# Patient Record
Sex: MALE | Race: WHITE | Employment: OTHER | URBAN - METROPOLITAN AREA
[De-identification: names, ages, dates, MRNs, and addresses within clinical notes are randomized per-mention and may not be internally consistent; named-entity substitution may affect disease eponyms.]

---

## 2021-01-01 ENCOUNTER — HOSPITAL ENCOUNTER (INPATIENT)
Age: 71
LOS: 7 days | DRG: 190 | End: 2021-03-03
Attending: EMERGENCY MEDICINE | Admitting: INTERNAL MEDICINE
Payer: MEDICARE

## 2021-01-01 ENCOUNTER — APPOINTMENT (OUTPATIENT)
Dept: MRI IMAGING | Age: 71
DRG: 190 | End: 2021-01-01
Payer: MEDICARE

## 2021-01-01 ENCOUNTER — ANESTHESIA (OUTPATIENT)
Dept: ENDOSCOPY | Age: 71
DRG: 190 | End: 2021-01-01
Payer: MEDICARE

## 2021-01-01 ENCOUNTER — ANESTHESIA EVENT (OUTPATIENT)
Dept: ENDOSCOPY | Age: 71
DRG: 190 | End: 2021-01-01
Payer: MEDICARE

## 2021-01-01 ENCOUNTER — APPOINTMENT (OUTPATIENT)
Dept: GENERAL RADIOLOGY | Age: 71
DRG: 190 | End: 2021-01-01
Payer: MEDICARE

## 2021-01-01 ENCOUNTER — APPOINTMENT (OUTPATIENT)
Dept: CT IMAGING | Age: 71
DRG: 190 | End: 2021-01-01
Payer: MEDICARE

## 2021-01-01 VITALS — OXYGEN SATURATION: 93 % | SYSTOLIC BLOOD PRESSURE: 127 MMHG | DIASTOLIC BLOOD PRESSURE: 79 MMHG

## 2021-01-01 VITALS
BODY MASS INDEX: 23.34 KG/M2 | WEIGHT: 163 LBS | RESPIRATION RATE: 32 BRPM | TEMPERATURE: 98.8 F | HEIGHT: 70 IN | SYSTOLIC BLOOD PRESSURE: 112 MMHG | HEART RATE: 113 BPM | OXYGEN SATURATION: 60 % | DIASTOLIC BLOOD PRESSURE: 62 MMHG

## 2021-01-01 DIAGNOSIS — K80.20 CALCULUS OF GALLBLADDER WITHOUT CHOLECYSTITIS WITHOUT OBSTRUCTION: ICD-10-CM

## 2021-01-01 DIAGNOSIS — R77.8 ELEVATED TROPONIN: ICD-10-CM

## 2021-01-01 DIAGNOSIS — N17.9 AKI (ACUTE KIDNEY INJURY) (HCC): ICD-10-CM

## 2021-01-01 DIAGNOSIS — J44.1 COPD EXACERBATION (HCC): Primary | ICD-10-CM

## 2021-01-01 LAB
ALBUMIN SERPL-MCNC: 3.1 G/DL (ref 3.5–5.2)
ALBUMIN SERPL-MCNC: 3.4 G/DL (ref 3.5–5.2)
ALP BLD-CCNC: 90 U/L (ref 40–129)
ALP BLD-CCNC: 91 U/L (ref 40–129)
ALT SERPL-CCNC: 40 U/L (ref 0–40)
ALT SERPL-CCNC: 42 U/L (ref 0–40)
ANION GAP SERPL CALCULATED.3IONS-SCNC: 17 MMOL/L (ref 7–16)
ANION GAP SERPL CALCULATED.3IONS-SCNC: 18 MMOL/L (ref 7–16)
ANION GAP SERPL CALCULATED.3IONS-SCNC: 19 MMOL/L (ref 7–16)
ANION GAP SERPL CALCULATED.3IONS-SCNC: 8 MMOL/L (ref 7–16)
ANION GAP SERPL CALCULATED.3IONS-SCNC: 9 MMOL/L (ref 7–16)
ANISOCYTOSIS: ABNORMAL
AST SERPL-CCNC: 47 U/L (ref 0–39)
AST SERPL-CCNC: 78 U/L (ref 0–39)
B.E.: -4.6 MMOL/L (ref -3–3)
B.E.: 4.3 MMOL/L (ref -3–3)
BASOPHILS ABSOLUTE: 0 E9/L (ref 0–0.2)
BASOPHILS ABSOLUTE: 0.02 E9/L (ref 0–0.2)
BASOPHILS RELATIVE PERCENT: 0 % (ref 0–2)
BASOPHILS RELATIVE PERCENT: 0 % (ref 0–2)
BASOPHILS RELATIVE PERCENT: 0.2 % (ref 0–2)
BASOPHILS RELATIVE PERCENT: 0.6 % (ref 0–2)
BASOPHILS RELATIVE PERCENT: 0.9 % (ref 0–2)
BILIRUB SERPL-MCNC: 0.4 MG/DL (ref 0–1.2)
BILIRUB SERPL-MCNC: 0.5 MG/DL (ref 0–1.2)
BUN BLDV-MCNC: 26 MG/DL (ref 8–23)
BUN BLDV-MCNC: 28 MG/DL (ref 8–23)
BUN BLDV-MCNC: 34 MG/DL (ref 8–23)
BUN BLDV-MCNC: 45 MG/DL (ref 8–23)
BUN BLDV-MCNC: 62 MG/DL (ref 8–23)
BURR CELLS: ABNORMAL
BURR CELLS: ABNORMAL
CALCIUM SERPL-MCNC: 8.1 MG/DL (ref 8.6–10.2)
CALCIUM SERPL-MCNC: 8.4 MG/DL (ref 8.6–10.2)
CALCIUM SERPL-MCNC: 8.5 MG/DL (ref 8.6–10.2)
CALCIUM SERPL-MCNC: 8.6 MG/DL (ref 8.6–10.2)
CALCIUM SERPL-MCNC: 8.9 MG/DL (ref 8.6–10.2)
CHLORIDE BLD-SCNC: 100 MMOL/L (ref 98–107)
CHLORIDE BLD-SCNC: 91 MMOL/L (ref 98–107)
CHLORIDE BLD-SCNC: 93 MMOL/L (ref 98–107)
CHLORIDE BLD-SCNC: 94 MMOL/L (ref 98–107)
CHLORIDE BLD-SCNC: 98 MMOL/L (ref 98–107)
CHOLESTEROL, TOTAL: 173 MG/DL (ref 0–199)
CO2: 22 MMOL/L (ref 22–29)
CO2: 23 MMOL/L (ref 22–29)
CO2: 24 MMOL/L (ref 22–29)
CO2: 30 MMOL/L (ref 22–29)
CO2: 30 MMOL/L (ref 22–29)
COHB: 0.1 % (ref 0–1.5)
COHB: 0.3 % (ref 0–1.5)
CREAT SERPL-MCNC: 1 MG/DL (ref 0.7–1.2)
CREAT SERPL-MCNC: 1 MG/DL (ref 0.7–1.2)
CREAT SERPL-MCNC: 1.4 MG/DL (ref 0.7–1.2)
CREAT SERPL-MCNC: 2 MG/DL (ref 0.7–1.2)
CREAT SERPL-MCNC: 2.1 MG/DL (ref 0.7–1.2)
CRITICAL: ABNORMAL
CRITICAL: ABNORMAL
DATE ANALYZED: ABNORMAL
DATE ANALYZED: ABNORMAL
DATE OF COLLECTION: ABNORMAL
DATE OF COLLECTION: ABNORMAL
EKG ATRIAL RATE: 122 BPM
EKG ATRIAL RATE: 90 BPM
EKG ATRIAL RATE: 90 BPM
EKG ATRIAL RATE: 92 BPM
EKG P AXIS: 83 DEGREES
EKG P AXIS: 84 DEGREES
EKG P AXIS: 86 DEGREES
EKG P AXIS: 89 DEGREES
EKG P-R INTERVAL: 144 MS
EKG P-R INTERVAL: 170 MS
EKG P-R INTERVAL: 174 MS
EKG P-R INTERVAL: 180 MS
EKG Q-T INTERVAL: 274 MS
EKG Q-T INTERVAL: 354 MS
EKG Q-T INTERVAL: 362 MS
EKG Q-T INTERVAL: 384 MS
EKG QRS DURATION: 110 MS
EKG QRS DURATION: 112 MS
EKG QRS DURATION: 112 MS
EKG QRS DURATION: 98 MS
EKG QTC CALCULATION (BAZETT): 390 MS
EKG QTC CALCULATION (BAZETT): 433 MS
EKG QTC CALCULATION (BAZETT): 447 MS
EKG QTC CALCULATION (BAZETT): 469 MS
EKG R AXIS: 84 DEGREES
EKG R AXIS: 88 DEGREES
EKG R AXIS: 90 DEGREES
EKG R AXIS: 96 DEGREES
EKG T AXIS: 102 DEGREES
EKG T AXIS: 68 DEGREES
EKG T AXIS: 7 DEGREES
EKG T AXIS: 73 DEGREES
EKG VENTRICULAR RATE: 122 BPM
EKG VENTRICULAR RATE: 90 BPM
EKG VENTRICULAR RATE: 90 BPM
EKG VENTRICULAR RATE: 92 BPM
EOSINOPHILS ABSOLUTE: 0 E9/L (ref 0.05–0.5)
EOSINOPHILS ABSOLUTE: 0.05 E9/L (ref 0.05–0.5)
EOSINOPHILS ABSOLUTE: 0.14 E9/L (ref 0.05–0.5)
EOSINOPHILS RELATIVE PERCENT: 0 % (ref 0–6)
EOSINOPHILS RELATIVE PERCENT: 0.9 % (ref 0–6)
EOSINOPHILS RELATIVE PERCENT: 2.6 % (ref 0–6)
FIO2: 60 %
FOLATE: 19.1 NG/ML (ref 4.8–24.2)
GFR AFRICAN AMERICAN: 38
GFR AFRICAN AMERICAN: 40
GFR AFRICAN AMERICAN: >60
GFR NON-AFRICAN AMERICAN: 31 ML/MIN/1.73
GFR NON-AFRICAN AMERICAN: 33 ML/MIN/1.73
GFR NON-AFRICAN AMERICAN: 50 ML/MIN/1.73
GFR NON-AFRICAN AMERICAN: >60 ML/MIN/1.73
GFR NON-AFRICAN AMERICAN: >60 ML/MIN/1.73
GLUCOSE BLD-MCNC: 167 MG/DL (ref 74–99)
GLUCOSE BLD-MCNC: 192 MG/DL (ref 74–99)
GLUCOSE BLD-MCNC: 65 MG/DL (ref 74–99)
GLUCOSE BLD-MCNC: 91 MG/DL (ref 74–99)
GLUCOSE BLD-MCNC: 98 MG/DL (ref 74–99)
HBA1C MFR BLD: 4.9 % (ref 4–5.6)
HCO3: 21.5 MMOL/L (ref 22–26)
HCO3: 28.3 MMOL/L (ref 22–26)
HCT VFR BLD CALC: 33.2 % (ref 37–54)
HCT VFR BLD CALC: 33.5 % (ref 37–54)
HCT VFR BLD CALC: 34.2 % (ref 37–54)
HCT VFR BLD CALC: 40.8 % (ref 37–54)
HCT VFR BLD CALC: 44.9 % (ref 37–54)
HDLC SERPL-MCNC: 101 MG/DL
HEMOGLOBIN: 10.9 G/DL (ref 12.5–16.5)
HEMOGLOBIN: 11 G/DL (ref 12.5–16.5)
HEMOGLOBIN: 11.6 G/DL (ref 12.5–16.5)
HEMOGLOBIN: 14 G/DL (ref 12.5–16.5)
HEMOGLOBIN: 14.7 G/DL (ref 12.5–16.5)
HHB: 4.7 % (ref 0–5)
HHB: 8.3 % (ref 0–5)
HYPOCHROMIA: ABNORMAL
IMMATURE GRANULOCYTES #: 0.09 E9/L
IMMATURE GRANULOCYTES #: 0.19 E9/L
IMMATURE GRANULOCYTES %: 1.2 % (ref 0–5)
IMMATURE GRANULOCYTES %: 1.6 % (ref 0–5)
LAB: ABNORMAL
LAB: ABNORMAL
LACTIC ACID: 2.1 MMOL/L (ref 0.5–2.2)
LDL CHOLESTEROL CALCULATED: 54 MG/DL (ref 0–99)
LV EF: 58 %
LVEF MODALITY: NORMAL
LYMPHOCYTES ABSOLUTE: 0.16 E9/L (ref 1.5–4)
LYMPHOCYTES ABSOLUTE: 0.31 E9/L (ref 1.5–4)
LYMPHOCYTES ABSOLUTE: 0.42 E9/L (ref 1.5–4)
LYMPHOCYTES ABSOLUTE: 0.54 E9/L (ref 1.5–4)
LYMPHOCYTES ABSOLUTE: 0.86 E9/L (ref 1.5–4)
LYMPHOCYTES RELATIVE PERCENT: 10.4 % (ref 20–42)
LYMPHOCYTES RELATIVE PERCENT: 11.7 % (ref 20–42)
LYMPHOCYTES RELATIVE PERCENT: 2.5 % (ref 20–42)
LYMPHOCYTES RELATIVE PERCENT: 4.4 % (ref 20–42)
LYMPHOCYTES RELATIVE PERCENT: 7.8 % (ref 20–42)
Lab: ABNORMAL
Lab: ABNORMAL
MCH RBC QN AUTO: 35.4 PG (ref 26–35)
MCH RBC QN AUTO: 35.8 PG (ref 26–35)
MCH RBC QN AUTO: 35.9 PG (ref 26–35)
MCH RBC QN AUTO: 35.9 PG (ref 26–35)
MCH RBC QN AUTO: 36.3 PG (ref 26–35)
MCHC RBC AUTO-ENTMCNC: 32.7 % (ref 32–34.5)
MCHC RBC AUTO-ENTMCNC: 32.8 % (ref 32–34.5)
MCHC RBC AUTO-ENTMCNC: 32.8 % (ref 32–34.5)
MCHC RBC AUTO-ENTMCNC: 33.9 % (ref 32–34.5)
MCHC RBC AUTO-ENTMCNC: 34.3 % (ref 32–34.5)
MCV RBC AUTO: 105.7 FL (ref 80–99.9)
MCV RBC AUTO: 105.9 FL (ref 80–99.9)
MCV RBC AUTO: 107.7 FL (ref 80–99.9)
MCV RBC AUTO: 109.2 FL (ref 80–99.9)
MCV RBC AUTO: 109.2 FL (ref 80–99.9)
METAMYELOCYTES RELATIVE PERCENT: 0.9 % (ref 0–1)
METER GLUCOSE: 123 MG/DL (ref 74–99)
METER GLUCOSE: 174 MG/DL (ref 74–99)
METER GLUCOSE: 54 MG/DL (ref 74–99)
METER GLUCOSE: 69 MG/DL (ref 74–99)
METER GLUCOSE: 93 MG/DL (ref 74–99)
METHB: 0.4 % (ref 0–1.5)
METHB: 0.5 % (ref 0–1.5)
MODE: ABNORMAL
MODE: ABNORMAL
MONOCYTES ABSOLUTE: 0.23 E9/L (ref 0.1–0.95)
MONOCYTES ABSOLUTE: 0.65 E9/L (ref 0.1–0.95)
MONOCYTES ABSOLUTE: 0.8 E9/L (ref 0.1–0.95)
MONOCYTES ABSOLUTE: 0.8 E9/L (ref 0.1–0.95)
MONOCYTES ABSOLUTE: 0.95 E9/L (ref 0.1–0.95)
MONOCYTES RELATIVE PERCENT: 12.2 % (ref 2–12)
MONOCYTES RELATIVE PERCENT: 12.9 % (ref 2–12)
MONOCYTES RELATIVE PERCENT: 14.8 % (ref 2–12)
MONOCYTES RELATIVE PERCENT: 6.1 % (ref 2–12)
MONOCYTES RELATIVE PERCENT: 6.5 % (ref 2–12)
MYELOCYTE PERCENT: 0.9 % (ref 0–0)
NEUTROPHILS ABSOLUTE: 10.9 E9/L (ref 1.8–7.3)
NEUTROPHILS ABSOLUTE: 3.51 E9/L (ref 1.8–7.3)
NEUTROPHILS ABSOLUTE: 4.05 E9/L (ref 1.8–7.3)
NEUTROPHILS ABSOLUTE: 4.08 E9/L (ref 1.8–7.3)
NEUTROPHILS ABSOLUTE: 5.47 E9/L (ref 1.8–7.3)
NEUTROPHILS RELATIVE PERCENT: 73 % (ref 43–80)
NEUTROPHILS RELATIVE PERCENT: 74.2 % (ref 43–80)
NEUTROPHILS RELATIVE PERCENT: 76.5 % (ref 43–80)
NEUTROPHILS RELATIVE PERCENT: 89.2 % (ref 43–80)
NEUTROPHILS RELATIVE PERCENT: 89.5 % (ref 43–80)
O2 CONTENT: 15.6 ML/DL
O2 CONTENT: 20.3 ML/DL
O2 SATURATION: 91.6 % (ref 92–98.5)
O2 SATURATION: 95.3 % (ref 92–98.5)
O2HB: 91 % (ref 94–97)
O2HB: 94.7 % (ref 94–97)
OPERATOR ID: 7490
OPERATOR ID: ABNORMAL
OVALOCYTES: ABNORMAL
OVALOCYTES: ABNORMAL
PATIENT TEMP: 37 C
PATIENT TEMP: 37 C
PCO2: 39.8 MMHG (ref 35–45)
PCO2: 43.3 MMHG (ref 35–45)
PDW BLD-RTO: 12.9 FL (ref 11.5–15)
PDW BLD-RTO: 13 FL (ref 11.5–15)
PDW BLD-RTO: 13.2 FL (ref 11.5–15)
PDW BLD-RTO: 13.2 FL (ref 11.5–15)
PDW BLD-RTO: 13.5 FL (ref 11.5–15)
PEEP/CPAP: 5 CMH2O
PFO2: 1.3 MMHG/%
PH BLOOD GAS: 7.31 (ref 7.35–7.45)
PH BLOOD GAS: 7.47 (ref 7.35–7.45)
PIP: 12 CMH2O
PLATELET # BLD: 229 E9/L (ref 130–450)
PLATELET # BLD: 238 E9/L (ref 130–450)
PLATELET # BLD: 246 E9/L (ref 130–450)
PLATELET # BLD: 288 E9/L (ref 130–450)
PLATELET # BLD: 293 E9/L (ref 130–450)
PMV BLD AUTO: 10.7 FL (ref 7–12)
PMV BLD AUTO: 10.8 FL (ref 7–12)
PMV BLD AUTO: 10.8 FL (ref 7–12)
PMV BLD AUTO: 11.2 FL (ref 7–12)
PMV BLD AUTO: 11.3 FL (ref 7–12)
PO2: 57.3 MMHG (ref 75–100)
PO2: 77.9 MMHG (ref 75–100)
POIKILOCYTES: ABNORMAL
POIKILOCYTES: ABNORMAL
POLYCHROMASIA: ABNORMAL
POLYCHROMASIA: ABNORMAL
POTASSIUM REFLEX MAGNESIUM: 4.1 MMOL/L (ref 3.5–5)
POTASSIUM REFLEX MAGNESIUM: 4.4 MMOL/L (ref 3.5–5)
POTASSIUM SERPL-SCNC: 3.6 MMOL/L (ref 3.5–5)
POTASSIUM SERPL-SCNC: 3.7 MMOL/L (ref 3.5–5)
POTASSIUM SERPL-SCNC: 4.7 MMOL/L (ref 3.5–5)
PRO-BNP: 2685 PG/ML (ref 0–125)
PROCALCITONIN: 2.91 NG/ML (ref 0–0.08)
RBC # BLD: 3.04 E12/L (ref 3.8–5.8)
RBC # BLD: 3.11 E12/L (ref 3.8–5.8)
RBC # BLD: 3.23 E12/L (ref 3.8–5.8)
RBC # BLD: 3.86 E12/L (ref 3.8–5.8)
RBC # BLD: 4.11 E12/L (ref 3.8–5.8)
RI(T): 3.69
SARS-COV-2, NAAT: NOT DETECTED
SODIUM BLD-SCNC: 132 MMOL/L (ref 132–146)
SODIUM BLD-SCNC: 133 MMOL/L (ref 132–146)
SODIUM BLD-SCNC: 135 MMOL/L (ref 132–146)
SODIUM BLD-SCNC: 137 MMOL/L (ref 132–146)
SODIUM BLD-SCNC: 139 MMOL/L (ref 132–146)
SOURCE, BLOOD GAS: ABNORMAL
SOURCE, BLOOD GAS: ABNORMAL
THB: 12.2 G/DL (ref 11.5–16.5)
THB: 15.2 G/DL (ref 11.5–16.5)
TIME ANALYZED: 550
TIME ANALYZED: 754
TOTAL PROTEIN: 5.3 G/DL (ref 6.4–8.3)
TOTAL PROTEIN: 5.7 G/DL (ref 6.4–8.3)
TRIGL SERPL-MCNC: 91 MG/DL (ref 0–149)
TROPONIN: 0.08 NG/ML (ref 0–0.03)
TROPONIN: 0.41 NG/ML (ref 0–0.03)
TSH SERPL DL<=0.05 MIU/L-ACNC: 1.61 UIU/ML (ref 0.27–4.2)
VITAMIN B-12: 884 PG/ML (ref 211–946)
VLDLC SERPL CALC-MCNC: 18 MG/DL
WBC # BLD: 12.2 E9/L (ref 4.5–11.5)
WBC # BLD: 3.9 E9/L (ref 4.5–11.5)
WBC # BLD: 5.3 E9/L (ref 4.5–11.5)
WBC # BLD: 5.4 E9/L (ref 4.5–11.5)
WBC # BLD: 7.4 E9/L (ref 4.5–11.5)

## 2021-01-01 PROCEDURE — 6360000002 HC RX W HCPCS: Performed by: INTERNAL MEDICINE

## 2021-01-01 PROCEDURE — 2060000000 HC ICU INTERMEDIATE R&B

## 2021-01-01 PROCEDURE — 2580000003 HC RX 258: Performed by: INTERNAL MEDICINE

## 2021-01-01 PROCEDURE — 85025 COMPLETE CBC W/AUTO DIFF WBC: CPT

## 2021-01-01 PROCEDURE — 97110 THERAPEUTIC EXERCISES: CPT

## 2021-01-01 PROCEDURE — 93306 TTE W/DOPPLER COMPLETE: CPT

## 2021-01-01 PROCEDURE — 82805 BLOOD GASES W/O2 SATURATION: CPT

## 2021-01-01 PROCEDURE — 2700000000 HC OXYGEN THERAPY PER DAY

## 2021-01-01 PROCEDURE — 2580000003 HC RX 258: Performed by: NURSE ANESTHETIST, CERTIFIED REGISTERED

## 2021-01-01 PROCEDURE — 6370000000 HC RX 637 (ALT 250 FOR IP): Performed by: INTERNAL MEDICINE

## 2021-01-01 PROCEDURE — 94660 CPAP INITIATION&MGMT: CPT

## 2021-01-01 PROCEDURE — 84145 PROCALCITONIN (PCT): CPT

## 2021-01-01 PROCEDURE — 0DB58ZX EXCISION OF ESOPHAGUS, VIA NATURAL OR ARTIFICIAL OPENING ENDOSCOPIC, DIAGNOSTIC: ICD-10-PCS | Performed by: INTERNAL MEDICINE

## 2021-01-01 PROCEDURE — 6360000002 HC RX W HCPCS

## 2021-01-01 PROCEDURE — 97530 THERAPEUTIC ACTIVITIES: CPT

## 2021-01-01 PROCEDURE — 82962 GLUCOSE BLOOD TEST: CPT

## 2021-01-01 PROCEDURE — 99232 SBSQ HOSP IP/OBS MODERATE 35: CPT | Performed by: INTERNAL MEDICINE

## 2021-01-01 PROCEDURE — 3609017700 HC EGD DILATION GASTRIC/DUODENAL STRICTURE: Performed by: INTERNAL MEDICINE

## 2021-01-01 PROCEDURE — 94640 AIRWAY INHALATION TREATMENT: CPT

## 2021-01-01 PROCEDURE — 0DB68ZX EXCISION OF STOMACH, VIA NATURAL OR ARTIFICIAL OPENING ENDOSCOPIC, DIAGNOSTIC: ICD-10-PCS | Performed by: INTERNAL MEDICINE

## 2021-01-01 PROCEDURE — 2500000003 HC RX 250 WO HCPCS: Performed by: INTERNAL MEDICINE

## 2021-01-01 PROCEDURE — 82607 VITAMIN B-12: CPT

## 2021-01-01 PROCEDURE — 97165 OT EVAL LOW COMPLEX 30 MIN: CPT

## 2021-01-01 PROCEDURE — 80048 BASIC METABOLIC PNL TOTAL CA: CPT

## 2021-01-01 PROCEDURE — 92611 MOTION FLUOROSCOPY/SWALLOW: CPT | Performed by: SPEECH-LANGUAGE PATHOLOGIST

## 2021-01-01 PROCEDURE — 83036 HEMOGLOBIN GLYCOSYLATED A1C: CPT

## 2021-01-01 PROCEDURE — 36415 COLL VENOUS BLD VENIPUNCTURE: CPT

## 2021-01-01 PROCEDURE — 80053 COMPREHEN METABOLIC PANEL: CPT

## 2021-01-01 PROCEDURE — 93005 ELECTROCARDIOGRAM TRACING: CPT | Performed by: INTERNAL MEDICINE

## 2021-01-01 PROCEDURE — 2580000003 HC RX 258: Performed by: SURGERY

## 2021-01-01 PROCEDURE — 93010 ELECTROCARDIOGRAM REPORT: CPT | Performed by: INTERNAL MEDICINE

## 2021-01-01 PROCEDURE — 2709999900 HC NON-CHARGEABLE SUPPLY: Performed by: INTERNAL MEDICINE

## 2021-01-01 PROCEDURE — 3700000000 HC ANESTHESIA ATTENDED CARE: Performed by: INTERNAL MEDICINE

## 2021-01-01 PROCEDURE — 84443 ASSAY THYROID STIM HORMONE: CPT

## 2021-01-01 PROCEDURE — 70551 MRI BRAIN STEM W/O DYE: CPT

## 2021-01-01 PROCEDURE — 71250 CT THORAX DX C-: CPT

## 2021-01-01 PROCEDURE — 99291 CRITICAL CARE FIRST HOUR: CPT | Performed by: INTERNAL MEDICINE

## 2021-01-01 PROCEDURE — 83880 ASSAY OF NATRIURETIC PEPTIDE: CPT

## 2021-01-01 PROCEDURE — 72141 MRI NECK SPINE W/O DYE: CPT

## 2021-01-01 PROCEDURE — 93005 ELECTROCARDIOGRAM TRACING: CPT | Performed by: EMERGENCY MEDICINE

## 2021-01-01 PROCEDURE — 3609012400 HC EGD TRANSORAL BIOPSY SINGLE/MULTIPLE: Performed by: INTERNAL MEDICINE

## 2021-01-01 PROCEDURE — 93005 ELECTROCARDIOGRAM TRACING: CPT | Performed by: STUDENT IN AN ORGANIZED HEALTH CARE EDUCATION/TRAINING PROGRAM

## 2021-01-01 PROCEDURE — 84484 ASSAY OF TROPONIN QUANT: CPT

## 2021-01-01 PROCEDURE — 6360000004 HC RX CONTRAST MEDICATION: Performed by: RADIOLOGY

## 2021-01-01 PROCEDURE — 36600 WITHDRAWAL OF ARTERIAL BLOOD: CPT

## 2021-01-01 PROCEDURE — 97535 SELF CARE MNGMENT TRAINING: CPT

## 2021-01-01 PROCEDURE — 71275 CT ANGIOGRAPHY CHEST: CPT

## 2021-01-01 PROCEDURE — 82746 ASSAY OF FOLIC ACID SERUM: CPT

## 2021-01-01 PROCEDURE — 3700000001 HC ADD 15 MINUTES (ANESTHESIA): Performed by: INTERNAL MEDICINE

## 2021-01-01 PROCEDURE — 99222 1ST HOSP IP/OBS MODERATE 55: CPT | Performed by: STUDENT IN AN ORGANIZED HEALTH CARE EDUCATION/TRAINING PROGRAM

## 2021-01-01 PROCEDURE — 74230 X-RAY XM SWLNG FUNCJ C+: CPT

## 2021-01-01 PROCEDURE — 97110 THERAPEUTIC EXERCISES: CPT | Performed by: PHYSICAL THERAPIST

## 2021-01-01 PROCEDURE — 83605 ASSAY OF LACTIC ACID: CPT

## 2021-01-01 PROCEDURE — 88305 TISSUE EXAM BY PATHOLOGIST: CPT

## 2021-01-01 PROCEDURE — 88342 IMHCHEM/IMCYTCHM 1ST ANTB: CPT

## 2021-01-01 PROCEDURE — 2580000003 HC RX 258: Performed by: STUDENT IN AN ORGANIZED HEALTH CARE EDUCATION/TRAINING PROGRAM

## 2021-01-01 PROCEDURE — 71045 X-RAY EXAM CHEST 1 VIEW: CPT

## 2021-01-01 PROCEDURE — 80061 LIPID PANEL: CPT

## 2021-01-01 PROCEDURE — 92526 ORAL FUNCTION THERAPY: CPT | Performed by: SPEECH-LANGUAGE PATHOLOGIST

## 2021-01-01 PROCEDURE — 7100000010 HC PHASE II RECOVERY - FIRST 15 MIN: Performed by: INTERNAL MEDICINE

## 2021-01-01 PROCEDURE — 92610 EVALUATE SWALLOWING FUNCTION: CPT | Performed by: SPEECH-LANGUAGE PATHOLOGIST

## 2021-01-01 PROCEDURE — 99285 EMERGENCY DEPT VISIT HI MDM: CPT

## 2021-01-01 PROCEDURE — 6370000000 HC RX 637 (ALT 250 FOR IP): Performed by: EMERGENCY MEDICINE

## 2021-01-01 PROCEDURE — 87635 SARS-COV-2 COVID-19 AMP PRB: CPT

## 2021-01-01 PROCEDURE — 6360000002 HC RX W HCPCS: Performed by: NURSE ANESTHETIST, CERTIFIED REGISTERED

## 2021-01-01 PROCEDURE — 0D758ZZ DILATION OF ESOPHAGUS, VIA NATURAL OR ARTIFICIAL OPENING ENDOSCOPIC: ICD-10-PCS | Performed by: INTERNAL MEDICINE

## 2021-01-01 PROCEDURE — 99233 SBSQ HOSP IP/OBS HIGH 50: CPT | Performed by: INTERNAL MEDICINE

## 2021-01-01 PROCEDURE — 74220 X-RAY XM ESOPHAGUS 1CNTRST: CPT

## 2021-01-01 PROCEDURE — 99223 1ST HOSP IP/OBS HIGH 75: CPT | Performed by: INTERNAL MEDICINE

## 2021-01-01 PROCEDURE — 7100000011 HC PHASE II RECOVERY - ADDTL 15 MIN: Performed by: INTERNAL MEDICINE

## 2021-01-01 PROCEDURE — 97161 PT EVAL LOW COMPLEX 20 MIN: CPT | Performed by: PHYSICAL THERAPIST

## 2021-01-01 RX ORDER — HYDROCODONE BITARTRATE AND ACETAMINOPHEN 5; 325 MG/1; MG/1
1 TABLET ORAL 3 TIMES DAILY
COMMUNITY

## 2021-01-01 RX ORDER — AZITHROMYCIN 250 MG/1
250 TABLET, FILM COATED ORAL DAILY
Status: DISCONTINUED | OUTPATIENT
Start: 2021-01-01 | End: 2021-03-04 | Stop reason: HOSPADM

## 2021-01-01 RX ORDER — PROPOFOL 10 MG/ML
INJECTION, EMULSION INTRAVENOUS PRN
Status: DISCONTINUED | OUTPATIENT
Start: 2021-01-01 | End: 2021-01-01 | Stop reason: SDUPTHER

## 2021-01-01 RX ORDER — HYDROCODONE BITARTRATE AND ACETAMINOPHEN 5; 325 MG/1; MG/1
1 TABLET ORAL 3 TIMES DAILY
Status: DISCONTINUED | OUTPATIENT
Start: 2021-01-01 | End: 2021-01-01

## 2021-01-01 RX ORDER — METHYLPREDNISOLONE SODIUM SUCCINATE 125 MG/2ML
125 INJECTION, POWDER, LYOPHILIZED, FOR SOLUTION INTRAMUSCULAR; INTRAVENOUS ONCE
Status: COMPLETED | OUTPATIENT
Start: 2021-01-01 | End: 2021-01-01

## 2021-01-01 RX ORDER — METHYLPREDNISOLONE 4 MG/1
4 TABLET ORAL
Status: DISCONTINUED | OUTPATIENT
Start: 2021-01-01 | End: 2021-01-01

## 2021-01-01 RX ORDER — LORAZEPAM 2 MG/ML
0.5 INJECTION INTRAMUSCULAR ONCE
Status: COMPLETED | OUTPATIENT
Start: 2021-01-01 | End: 2021-01-01

## 2021-01-01 RX ORDER — HYDROCODONE BITARTRATE AND ACETAMINOPHEN 5; 325 MG/1; MG/1
1 TABLET ORAL 3 TIMES DAILY PRN
Status: DISCONTINUED | OUTPATIENT
Start: 2021-01-01 | End: 2021-03-04 | Stop reason: HOSPADM

## 2021-01-01 RX ORDER — LORAZEPAM 2 MG/ML
1 INJECTION INTRAMUSCULAR ONCE
Status: COMPLETED | OUTPATIENT
Start: 2021-01-01 | End: 2021-01-01

## 2021-01-01 RX ORDER — FUROSEMIDE 10 MG/ML
20 INJECTION INTRAMUSCULAR; INTRAVENOUS ONCE
Status: COMPLETED | OUTPATIENT
Start: 2021-01-01 | End: 2021-01-01

## 2021-01-01 RX ORDER — ASPIRIN 300 MG/1
300 SUPPOSITORY RECTAL DAILY
Status: DISCONTINUED | OUTPATIENT
Start: 2021-01-01 | End: 2021-03-04 | Stop reason: HOSPADM

## 2021-01-01 RX ORDER — BUDESONIDE 0.5 MG/2ML
0.5 INHALANT ORAL 2 TIMES DAILY
Status: DISCONTINUED | OUTPATIENT
Start: 2021-01-01 | End: 2021-03-04 | Stop reason: HOSPADM

## 2021-01-01 RX ORDER — SODIUM CHLORIDE 0.9 % (FLUSH) 0.9 %
10 SYRINGE (ML) INJECTION EVERY 12 HOURS SCHEDULED
Status: DISCONTINUED | OUTPATIENT
Start: 2021-01-01 | End: 2021-03-04 | Stop reason: HOSPADM

## 2021-01-01 RX ORDER — POLYETHYLENE GLYCOL 3350 17 G/17G
17 POWDER, FOR SOLUTION ORAL DAILY PRN
Status: DISCONTINUED | OUTPATIENT
Start: 2021-01-01 | End: 2021-03-04 | Stop reason: HOSPADM

## 2021-01-01 RX ORDER — METHYLPREDNISOLONE 4 MG/1
4 TABLET ORAL NIGHTLY
Status: DISCONTINUED | OUTPATIENT
Start: 2021-01-01 | End: 2021-01-01

## 2021-01-01 RX ORDER — SODIUM CHLORIDE 0.9 % (FLUSH) 0.9 %
10 SYRINGE (ML) INJECTION PRN
Status: DISCONTINUED | OUTPATIENT
Start: 2021-01-01 | End: 2021-03-04 | Stop reason: HOSPADM

## 2021-01-01 RX ORDER — VERAPAMIL HYDROCHLORIDE 240 MG/1
240 CAPSULE, EXTENDED RELEASE ORAL NIGHTLY
COMMUNITY

## 2021-01-01 RX ORDER — ROPINIROLE 0.5 MG/1
0.5 TABLET, FILM COATED ORAL NIGHTLY
COMMUNITY

## 2021-01-01 RX ORDER — ROPINIROLE 0.5 MG/1
0.5 TABLET, FILM COATED ORAL NIGHTLY
Status: DISCONTINUED | OUTPATIENT
Start: 2021-01-01 | End: 2021-03-04 | Stop reason: HOSPADM

## 2021-01-01 RX ORDER — METOPROLOL TARTRATE 5 MG/5ML
2.5 INJECTION INTRAVENOUS EVERY 6 HOURS
Status: DISCONTINUED | OUTPATIENT
Start: 2021-01-01 | End: 2021-03-04 | Stop reason: HOSPADM

## 2021-01-01 RX ORDER — LOSARTAN POTASSIUM AND HYDROCHLOROTHIAZIDE 12.5; 1 MG/1; MG/1
1 TABLET ORAL NIGHTLY
COMMUNITY

## 2021-01-01 RX ORDER — SODIUM CHLORIDE 9 MG/ML
INJECTION, SOLUTION INTRAVENOUS ONCE
Status: COMPLETED | OUTPATIENT
Start: 2021-01-01 | End: 2021-01-01

## 2021-01-01 RX ORDER — ONDANSETRON 2 MG/ML
4 INJECTION INTRAMUSCULAR; INTRAVENOUS EVERY 6 HOURS PRN
Status: DISCONTINUED | OUTPATIENT
Start: 2021-01-01 | End: 2021-03-04 | Stop reason: HOSPADM

## 2021-01-01 RX ORDER — VITAMIN B COMPLEX
1000 TABLET ORAL NIGHTLY
Status: DISCONTINUED | OUTPATIENT
Start: 2021-01-01 | End: 2021-03-04 | Stop reason: HOSPADM

## 2021-01-01 RX ORDER — ARFORMOTEROL TARTRATE 15 UG/2ML
15 SOLUTION RESPIRATORY (INHALATION) 2 TIMES DAILY
Status: DISCONTINUED | OUTPATIENT
Start: 2021-01-01 | End: 2021-03-04 | Stop reason: HOSPADM

## 2021-01-01 RX ORDER — PANTOPRAZOLE SODIUM 40 MG/1
40 TABLET, DELAYED RELEASE ORAL
Status: DISCONTINUED | OUTPATIENT
Start: 2021-01-01 | End: 2021-03-04 | Stop reason: HOSPADM

## 2021-01-01 RX ORDER — PREDNISONE 20 MG/1
40 TABLET ORAL DAILY
Status: DISCONTINUED | OUTPATIENT
Start: 2021-01-01 | End: 2021-01-01

## 2021-01-01 RX ORDER — IPRATROPIUM BROMIDE AND ALBUTEROL SULFATE 2.5; .5 MG/3ML; MG/3ML
3 SOLUTION RESPIRATORY (INHALATION) ONCE
Status: COMPLETED | OUTPATIENT
Start: 2021-01-01 | End: 2021-01-01

## 2021-01-01 RX ORDER — PROMETHAZINE HYDROCHLORIDE 25 MG/1
12.5 TABLET ORAL EVERY 6 HOURS PRN
Status: DISCONTINUED | OUTPATIENT
Start: 2021-01-01 | End: 2021-03-04 | Stop reason: HOSPADM

## 2021-01-01 RX ORDER — LORAZEPAM 2 MG/ML
1 INJECTION INTRAMUSCULAR EVERY 6 HOURS PRN
Status: DISCONTINUED | OUTPATIENT
Start: 2021-01-01 | End: 2021-03-04 | Stop reason: HOSPADM

## 2021-01-01 RX ORDER — METHYLPREDNISOLONE 4 MG/1
24 TABLET ORAL ONCE
Status: COMPLETED | OUTPATIENT
Start: 2021-01-01 | End: 2021-01-01

## 2021-01-01 RX ORDER — ALBUTEROL SULFATE 90 UG/1
2 AEROSOL, METERED RESPIRATORY (INHALATION) EVERY 6 HOURS PRN
COMMUNITY

## 2021-01-01 RX ORDER — BUDESONIDE AND FORMOTEROL FUMARATE DIHYDRATE 160; 4.5 UG/1; UG/1
2 AEROSOL RESPIRATORY (INHALATION) 2 TIMES DAILY
Status: DISCONTINUED | OUTPATIENT
Start: 2021-01-01 | End: 2021-01-01

## 2021-01-01 RX ORDER — MORPHINE SULFATE 2 MG/ML
2 INJECTION, SOLUTION INTRAMUSCULAR; INTRAVENOUS
Status: DISCONTINUED | OUTPATIENT
Start: 2021-01-01 | End: 2021-03-04 | Stop reason: HOSPADM

## 2021-01-01 RX ORDER — SODIUM CHLORIDE 0.9 % (FLUSH) 0.9 %
10 SYRINGE (ML) INJECTION EVERY 12 HOURS SCHEDULED
Status: DISCONTINUED | OUTPATIENT
Start: 2021-01-01 | End: 2021-01-01 | Stop reason: SDUPTHER

## 2021-01-01 RX ORDER — METHYLPREDNISOLONE 4 MG/1
8 TABLET ORAL NIGHTLY
Status: COMPLETED | OUTPATIENT
Start: 2021-01-01 | End: 2021-01-01

## 2021-01-01 RX ORDER — M-VIT,TX,IRON,MINS/CALC/FOLIC 27MG-0.4MG
1 TABLET ORAL DAILY
COMMUNITY

## 2021-01-01 RX ORDER — METHYLPREDNISOLONE SODIUM SUCCINATE 125 MG/2ML
80 INJECTION, POWDER, LYOPHILIZED, FOR SOLUTION INTRAMUSCULAR; INTRAVENOUS ONCE
Status: COMPLETED | OUTPATIENT
Start: 2021-01-01 | End: 2021-01-01

## 2021-01-01 RX ORDER — METHYLPREDNISOLONE SODIUM SUCCINATE 40 MG/ML
40 INJECTION, POWDER, LYOPHILIZED, FOR SOLUTION INTRAMUSCULAR; INTRAVENOUS 2 TIMES DAILY
Status: DISCONTINUED | OUTPATIENT
Start: 2021-01-01 | End: 2021-03-04 | Stop reason: HOSPADM

## 2021-01-01 RX ORDER — 0.9 % SODIUM CHLORIDE 0.9 %
1000 INTRAVENOUS SOLUTION INTRAVENOUS ONCE
Status: COMPLETED | OUTPATIENT
Start: 2021-01-01 | End: 2021-01-01

## 2021-01-01 RX ORDER — ACETAMINOPHEN 650 MG/1
650 SUPPOSITORY RECTAL EVERY 6 HOURS PRN
Status: DISCONTINUED | OUTPATIENT
Start: 2021-01-01 | End: 2021-03-04 | Stop reason: HOSPADM

## 2021-01-01 RX ORDER — METHYLPREDNISOLONE SODIUM SUCCINATE 40 MG/ML
40 INJECTION, POWDER, LYOPHILIZED, FOR SOLUTION INTRAMUSCULAR; INTRAVENOUS EVERY 12 HOURS
Status: DISCONTINUED | OUTPATIENT
Start: 2021-01-01 | End: 2021-01-01

## 2021-01-01 RX ORDER — ACETAMINOPHEN 325 MG/1
650 TABLET ORAL EVERY 6 HOURS PRN
Status: DISCONTINUED | OUTPATIENT
Start: 2021-01-01 | End: 2021-03-04 | Stop reason: HOSPADM

## 2021-01-01 RX ORDER — METHYLPREDNISOLONE SODIUM SUCCINATE 125 MG/2ML
INJECTION, POWDER, LYOPHILIZED, FOR SOLUTION INTRAMUSCULAR; INTRAVENOUS
Status: COMPLETED
Start: 2021-01-01 | End: 2021-01-01

## 2021-01-01 RX ORDER — SODIUM CHLORIDE 9 MG/ML
INJECTION, SOLUTION INTRAVENOUS EVERY 8 HOURS
Status: DISCONTINUED | OUTPATIENT
Start: 2021-01-01 | End: 2021-03-04 | Stop reason: HOSPADM

## 2021-01-01 RX ORDER — VERAPAMIL HYDROCHLORIDE 240 MG/1
240 CAPSULE, EXTENDED RELEASE ORAL NIGHTLY
Status: DISCONTINUED | OUTPATIENT
Start: 2021-01-01 | End: 2021-01-01

## 2021-01-01 RX ORDER — SODIUM CHLORIDE 9 MG/ML
INJECTION, SOLUTION INTRAVENOUS CONTINUOUS PRN
Status: DISCONTINUED | OUTPATIENT
Start: 2021-01-01 | End: 2021-01-01 | Stop reason: SDUPTHER

## 2021-01-01 RX ORDER — MORPHINE SULFATE 2 MG/ML
INJECTION, SOLUTION INTRAMUSCULAR; INTRAVENOUS
Status: DISCONTINUED
Start: 2021-01-01 | End: 2021-03-04 | Stop reason: HOSPADM

## 2021-01-01 RX ORDER — BUDESONIDE AND FORMOTEROL FUMARATE DIHYDRATE 160; 4.5 UG/1; UG/1
2 AEROSOL RESPIRATORY (INHALATION) 2 TIMES DAILY
COMMUNITY

## 2021-01-01 RX ORDER — AZITHROMYCIN 250 MG/1
500 TABLET, FILM COATED ORAL DAILY
Status: COMPLETED | OUTPATIENT
Start: 2021-01-01 | End: 2021-01-01

## 2021-01-01 RX ORDER — IPRATROPIUM BROMIDE AND ALBUTEROL SULFATE 2.5; .5 MG/3ML; MG/3ML
1 SOLUTION RESPIRATORY (INHALATION) EVERY 4 HOURS PRN
Status: DISCONTINUED | OUTPATIENT
Start: 2021-01-01 | End: 2021-03-04 | Stop reason: HOSPADM

## 2021-01-01 RX ORDER — VERAPAMIL HYDROCHLORIDE 240 MG/1
240 TABLET, FILM COATED, EXTENDED RELEASE ORAL NIGHTLY
Status: DISCONTINUED | OUTPATIENT
Start: 2021-01-01 | End: 2021-01-01

## 2021-01-01 RX ADMIN — PANTOPRAZOLE SODIUM 40 MG: 40 TABLET, DELAYED RELEASE ORAL at 15:39

## 2021-01-01 RX ADMIN — ACETAMINOPHEN 650 MG: 325 TABLET, FILM COATED ORAL at 08:44

## 2021-01-01 RX ADMIN — Medication 1000 UNITS: at 20:18

## 2021-01-01 RX ADMIN — NYSTATIN 500000 UNITS: 100000 SUSPENSION ORAL at 10:22

## 2021-01-01 RX ADMIN — VERAPAMIL HYDROCHLORIDE 120 MG: 120 TABLET, FILM COATED, EXTENDED RELEASE ORAL at 07:49

## 2021-01-01 RX ADMIN — Medication 0.5 MG: at 09:18

## 2021-01-01 RX ADMIN — NYSTATIN 500000 UNITS: 100000 SUSPENSION ORAL at 20:18

## 2021-01-01 RX ADMIN — BUDESONIDE 500 MCG: 0.5 INHALANT RESPIRATORY (INHALATION) at 06:16

## 2021-01-01 RX ADMIN — Medication 0.5 MG: at 06:02

## 2021-01-01 RX ADMIN — ENOXAPARIN SODIUM 40 MG: 40 INJECTION SUBCUTANEOUS at 08:44

## 2021-01-01 RX ADMIN — MORPHINE SULFATE 2 MG: 2 INJECTION, SOLUTION INTRAMUSCULAR; INTRAVENOUS at 15:45

## 2021-01-01 RX ADMIN — ROPINIROLE HYDROCHLORIDE 0.5 MG: 0.5 TABLET, FILM COATED ORAL at 19:37

## 2021-01-01 RX ADMIN — Medication 0.5 MG: at 15:18

## 2021-01-01 RX ADMIN — Medication 10 ML: at 20:49

## 2021-01-01 RX ADMIN — TIOTROPIUM BROMIDE 18 MCG: 18 CAPSULE ORAL; RESPIRATORY (INHALATION) at 05:17

## 2021-01-01 RX ADMIN — HYDROCODONE BITARTRATE AND ACETAMINOPHEN 1 TABLET: 5; 325 TABLET ORAL at 13:16

## 2021-01-01 RX ADMIN — Medication 1000 UNITS: at 20:53

## 2021-01-01 RX ADMIN — NYSTATIN 500000 UNITS: 100000 SUSPENSION ORAL at 07:48

## 2021-01-01 RX ADMIN — IPRATROPIUM BROMIDE AND ALBUTEROL SULFATE 1 AMPULE: .5; 3 SOLUTION RESPIRATORY (INHALATION) at 21:26

## 2021-01-01 RX ADMIN — LORAZEPAM 1 MG: 2 INJECTION INTRAMUSCULAR; INTRAVENOUS at 20:39

## 2021-01-01 RX ADMIN — NYSTATIN 500000 UNITS: 100000 SUSPENSION ORAL at 12:21

## 2021-01-01 RX ADMIN — Medication 0.5 MG: at 10:16

## 2021-01-01 RX ADMIN — HYDROCODONE BITARTRATE AND ACETAMINOPHEN 1 TABLET: 5; 325 TABLET ORAL at 06:25

## 2021-01-01 RX ADMIN — NYSTATIN 500000 UNITS: 100000 SUSPENSION ORAL at 08:45

## 2021-01-01 RX ADMIN — ENOXAPARIN SODIUM 40 MG: 40 INJECTION SUBCUTANEOUS at 08:21

## 2021-01-01 RX ADMIN — ROPINIROLE HYDROCHLORIDE 0.5 MG: 0.5 TABLET, FILM COATED ORAL at 20:53

## 2021-01-01 RX ADMIN — MORPHINE SULFATE 2 MG: 2 INJECTION, SOLUTION INTRAMUSCULAR; INTRAVENOUS at 17:04

## 2021-01-01 RX ADMIN — METOPROLOL TARTRATE 2.5 MG: 5 INJECTION, SOLUTION INTRAVENOUS at 11:06

## 2021-01-01 RX ADMIN — NYSTATIN 500000 UNITS: 100000 SUSPENSION ORAL at 16:15

## 2021-01-01 RX ADMIN — BARIUM SULFATE 45 ML: 400 SUSPENSION ORAL at 14:32

## 2021-01-01 RX ADMIN — NYSTATIN 500000 UNITS: 100000 SUSPENSION ORAL at 15:43

## 2021-01-01 RX ADMIN — NYSTATIN 500000 UNITS: 100000 SUSPENSION ORAL at 20:47

## 2021-01-01 RX ADMIN — NYSTATIN 500000 UNITS: 100000 SUSPENSION ORAL at 20:38

## 2021-01-01 RX ADMIN — Medication 10 ML: at 07:58

## 2021-01-01 RX ADMIN — VERAPAMIL HYDROCHLORIDE 120 MG: 120 TABLET, FILM COATED, EXTENDED RELEASE ORAL at 10:23

## 2021-01-01 RX ADMIN — AZITHROMYCIN MONOHYDRATE 250 MG: 250 TABLET ORAL at 08:45

## 2021-01-01 RX ADMIN — FUROSEMIDE 20 MG: 10 INJECTION, SOLUTION INTRAMUSCULAR; INTRAVENOUS at 13:56

## 2021-01-01 RX ADMIN — METHYLPREDNISOLONE SODIUM SUCCINATE 80 MG: 125 INJECTION, POWDER, LYOPHILIZED, FOR SOLUTION INTRAMUSCULAR; INTRAVENOUS at 10:33

## 2021-01-01 RX ADMIN — FUROSEMIDE 20 MG: 10 INJECTION, SOLUTION INTRAMUSCULAR; INTRAVENOUS at 13:16

## 2021-01-01 RX ADMIN — MORPHINE SULFATE 2 MG: 2 INJECTION, SOLUTION INTRAMUSCULAR; INTRAVENOUS at 20:34

## 2021-01-01 RX ADMIN — ENOXAPARIN SODIUM 40 MG: 40 INJECTION SUBCUTANEOUS at 07:49

## 2021-01-01 RX ADMIN — Medication 0.5 MG: at 17:09

## 2021-01-01 RX ADMIN — BUDESONIDE 500 MCG: 0.5 INHALANT RESPIRATORY (INHALATION) at 06:47

## 2021-01-01 RX ADMIN — BUDESONIDE 500 MCG: 0.5 INHALANT RESPIRATORY (INHALATION) at 06:17

## 2021-01-01 RX ADMIN — HYDROCODONE BITARTRATE AND ACETAMINOPHEN 1 TABLET: 5; 325 TABLET ORAL at 16:35

## 2021-01-01 RX ADMIN — Medication 10 ML: at 09:15

## 2021-01-01 RX ADMIN — ASPIRIN 300 MG: 300 SUPPOSITORY RECTAL at 10:33

## 2021-01-01 RX ADMIN — Medication 1000 UNITS: at 20:30

## 2021-01-01 RX ADMIN — VERAPAMIL HYDROCHLORIDE 120 MG: 120 TABLET, FILM COATED, EXTENDED RELEASE ORAL at 08:45

## 2021-01-01 RX ADMIN — PANTOPRAZOLE SODIUM 40 MG: 40 TABLET, DELAYED RELEASE ORAL at 05:39

## 2021-01-01 RX ADMIN — Medication 10 ML: at 21:32

## 2021-01-01 RX ADMIN — Medication 1000 UNITS: at 20:42

## 2021-01-01 RX ADMIN — IPRATROPIUM BROMIDE AND ALBUTEROL SULFATE 3 AMPULE: .5; 3 SOLUTION RESPIRATORY (INHALATION) at 15:09

## 2021-01-01 RX ADMIN — HYDROCODONE BITARTRATE AND ACETAMINOPHEN 1 TABLET: 5; 325 TABLET ORAL at 05:39

## 2021-01-01 RX ADMIN — VERAPAMIL HYDROCHLORIDE 240 MG: 120 TABLET, FILM COATED, EXTENDED RELEASE ORAL at 20:42

## 2021-01-01 RX ADMIN — SODIUM CHLORIDE, PRESERVATIVE FREE 10 ML: 5 INJECTION INTRAVENOUS at 13:57

## 2021-01-01 RX ADMIN — NYSTATIN 500000 UNITS: 100000 SUSPENSION ORAL at 13:15

## 2021-01-01 RX ADMIN — BARIUM SULFATE 176 G: 960 POWDER, FOR SUSPENSION ORAL at 13:41

## 2021-01-01 RX ADMIN — PANTOPRAZOLE SODIUM 40 MG: 40 TABLET, DELAYED RELEASE ORAL at 16:13

## 2021-01-01 RX ADMIN — ARFORMOTEROL TARTRATE 15 MCG: 15 SOLUTION RESPIRATORY (INHALATION) at 06:02

## 2021-01-01 RX ADMIN — ROPINIROLE HYDROCHLORIDE 0.5 MG: 0.5 TABLET, FILM COATED ORAL at 20:42

## 2021-01-01 RX ADMIN — METHYLPREDNISOLONE 4 MG: 4 TABLET ORAL at 05:38

## 2021-01-01 RX ADMIN — PREDNISONE 40 MG: 20 TABLET ORAL at 10:23

## 2021-01-01 RX ADMIN — Medication 0.5 MG: at 06:17

## 2021-01-01 RX ADMIN — ARFORMOTEROL TARTRATE 15 MCG: 15 SOLUTION RESPIRATORY (INHALATION) at 17:30

## 2021-01-01 RX ADMIN — PANTOPRAZOLE SODIUM 40 MG: 40 TABLET, DELAYED RELEASE ORAL at 16:15

## 2021-01-01 RX ADMIN — BUDESONIDE 500 MCG: 0.5 INHALANT RESPIRATORY (INHALATION) at 19:29

## 2021-01-01 RX ADMIN — HYDROCODONE BITARTRATE AND ACETAMINOPHEN 1 TABLET: 5; 325 TABLET ORAL at 12:13

## 2021-01-01 RX ADMIN — METHYLPREDNISOLONE 24 MG: 4 TABLET ORAL at 16:54

## 2021-01-01 RX ADMIN — METHYLPREDNISOLONE SODIUM SUCCINATE 40 MG: 40 INJECTION, POWDER, FOR SOLUTION INTRAMUSCULAR; INTRAVENOUS at 20:32

## 2021-01-01 RX ADMIN — ENOXAPARIN SODIUM 40 MG: 40 INJECTION SUBCUTANEOUS at 20:05

## 2021-01-01 RX ADMIN — ARFORMOTEROL TARTRATE 15 MCG: 15 SOLUTION RESPIRATORY (INHALATION) at 06:17

## 2021-01-01 RX ADMIN — HYDROCODONE BITARTRATE AND ACETAMINOPHEN 1 TABLET: 5; 325 TABLET ORAL at 23:59

## 2021-01-01 RX ADMIN — METHYLPREDNISOLONE SODIUM SUCCINATE 40 MG: 40 INJECTION, POWDER, FOR SOLUTION INTRAMUSCULAR; INTRAVENOUS at 10:57

## 2021-01-01 RX ADMIN — ENOXAPARIN SODIUM 40 MG: 40 INJECTION SUBCUTANEOUS at 11:06

## 2021-01-01 RX ADMIN — Medication 0.5 MG: at 14:24

## 2021-01-01 RX ADMIN — METHYLPREDNISOLONE SODIUM SUCCINATE 125 MG: 125 INJECTION, POWDER, LYOPHILIZED, FOR SOLUTION INTRAMUSCULAR; INTRAVENOUS at 22:59

## 2021-01-01 RX ADMIN — ROPINIROLE HYDROCHLORIDE 0.5 MG: 0.5 TABLET, FILM COATED ORAL at 20:18

## 2021-01-01 RX ADMIN — ARFORMOTEROL TARTRATE 15 MCG: 15 SOLUTION RESPIRATORY (INHALATION) at 20:26

## 2021-01-01 RX ADMIN — METHYLPREDNISOLONE SODIUM SUCCINATE 125 MG: 125 INJECTION, POWDER, FOR SOLUTION INTRAMUSCULAR; INTRAVENOUS at 22:59

## 2021-01-01 RX ADMIN — LORAZEPAM 1 MG: 2 INJECTION INTRAMUSCULAR; INTRAVENOUS at 08:44

## 2021-01-01 RX ADMIN — IOPAMIDOL 80 ML: 755 INJECTION, SOLUTION INTRAVENOUS at 04:57

## 2021-01-01 RX ADMIN — AZITHROMYCIN MONOHYDRATE 500 MG: 250 TABLET ORAL at 16:54

## 2021-01-01 RX ADMIN — HYDROCODONE BITARTRATE AND ACETAMINOPHEN 1 TABLET: 5; 325 TABLET ORAL at 19:37

## 2021-01-01 RX ADMIN — PANTOPRAZOLE SODIUM 40 MG: 40 TABLET, DELAYED RELEASE ORAL at 16:55

## 2021-01-01 RX ADMIN — Medication 10 ML: at 08:13

## 2021-01-01 RX ADMIN — VERAPAMIL HYDROCHLORIDE 240 MG: 120 TABLET, FILM COATED, EXTENDED RELEASE ORAL at 20:30

## 2021-01-01 RX ADMIN — HYDROCODONE BITARTRATE AND ACETAMINOPHEN 1 TABLET: 5; 325 TABLET ORAL at 05:45

## 2021-01-01 RX ADMIN — IPRATROPIUM BROMIDE AND ALBUTEROL SULFATE 1 AMPULE: .5; 3 SOLUTION RESPIRATORY (INHALATION) at 22:13

## 2021-01-01 RX ADMIN — Medication 10 ML: at 20:42

## 2021-01-01 RX ADMIN — VERAPAMIL HYDROCHLORIDE 120 MG: 120 TABLET, FILM COATED, EXTENDED RELEASE ORAL at 10:57

## 2021-01-01 RX ADMIN — BUDESONIDE 500 MCG: 0.5 INHALANT RESPIRATORY (INHALATION) at 17:30

## 2021-01-01 RX ADMIN — Medication 1000 UNITS: at 19:37

## 2021-01-01 RX ADMIN — ARFORMOTEROL TARTRATE 15 MCG: 15 SOLUTION RESPIRATORY (INHALATION) at 19:29

## 2021-01-01 RX ADMIN — PROPOFOL 200 MG: 10 INJECTION, EMULSION INTRAVENOUS at 14:34

## 2021-01-01 RX ADMIN — Medication 0.5 MG: at 15:06

## 2021-01-01 RX ADMIN — VERAPAMIL HYDROCHLORIDE 240 MG: 120 TABLET, FILM COATED, EXTENDED RELEASE ORAL at 20:18

## 2021-01-01 RX ADMIN — Medication 10 ML: at 09:00

## 2021-01-01 RX ADMIN — BUDESONIDE 500 MCG: 0.5 INHALANT RESPIRATORY (INHALATION) at 05:47

## 2021-01-01 RX ADMIN — ARFORMOTEROL TARTRATE 15 MCG: 15 SOLUTION RESPIRATORY (INHALATION) at 19:13

## 2021-01-01 RX ADMIN — BUDESONIDE 500 MCG: 0.5 INHALANT RESPIRATORY (INHALATION) at 18:24

## 2021-01-01 RX ADMIN — LORAZEPAM 1 MG: 2 INJECTION INTRAMUSCULAR; INTRAVENOUS at 20:45

## 2021-01-01 RX ADMIN — PANTOPRAZOLE SODIUM 40 MG: 40 TABLET, DELAYED RELEASE ORAL at 05:37

## 2021-01-01 RX ADMIN — ARFORMOTEROL TARTRATE 15 MCG: 15 SOLUTION RESPIRATORY (INHALATION) at 05:47

## 2021-01-01 RX ADMIN — HYDROCODONE BITARTRATE AND ACETAMINOPHEN 1 TABLET: 5; 325 TABLET ORAL at 05:37

## 2021-01-01 RX ADMIN — HYDROCODONE BITARTRATE AND ACETAMINOPHEN 1 TABLET: 5; 325 TABLET ORAL at 21:18

## 2021-01-01 RX ADMIN — LORAZEPAM 1 MG: 2 INJECTION INTRAMUSCULAR; INTRAVENOUS at 22:22

## 2021-01-01 RX ADMIN — Medication 10 ML: at 10:57

## 2021-01-01 RX ADMIN — IPRATROPIUM BROMIDE AND ALBUTEROL SULFATE 1 AMPULE: .5; 3 SOLUTION RESPIRATORY (INHALATION) at 16:10

## 2021-01-01 RX ADMIN — BUDESONIDE 500 MCG: 0.5 INHALANT RESPIRATORY (INHALATION) at 06:28

## 2021-01-01 RX ADMIN — PREDNISONE 40 MG: 20 TABLET ORAL at 07:49

## 2021-01-01 RX ADMIN — BUDESONIDE 500 MCG: 0.5 INHALANT RESPIRATORY (INHALATION) at 19:13

## 2021-01-01 RX ADMIN — VERAPAMIL HYDROCHLORIDE 240 MG: 120 TABLET, FILM COATED, EXTENDED RELEASE ORAL at 20:38

## 2021-01-01 RX ADMIN — VERAPAMIL HYDROCHLORIDE 240 MG: 120 TABLET, FILM COATED, EXTENDED RELEASE ORAL at 19:37

## 2021-01-01 RX ADMIN — ARFORMOTEROL TARTRATE 15 MCG: 15 SOLUTION RESPIRATORY (INHALATION) at 06:28

## 2021-01-01 RX ADMIN — HYDROCODONE BITARTRATE AND ACETAMINOPHEN 1 TABLET: 5; 325 TABLET ORAL at 08:13

## 2021-01-01 RX ADMIN — LORAZEPAM 1 MG: 2 INJECTION INTRAMUSCULAR; INTRAVENOUS at 08:55

## 2021-01-01 RX ADMIN — Medication 0.5 MG: at 19:29

## 2021-01-01 RX ADMIN — PREDNISONE 40 MG: 20 TABLET ORAL at 08:12

## 2021-01-01 RX ADMIN — METHYLPREDNISOLONE 8 MG: 4 TABLET ORAL at 20:45

## 2021-01-01 RX ADMIN — Medication 0.5 MG: at 19:13

## 2021-01-01 RX ADMIN — LORAZEPAM 0.5 MG: 2 INJECTION INTRAMUSCULAR; INTRAVENOUS at 22:58

## 2021-01-01 RX ADMIN — SODIUM CHLORIDE: 9 INJECTION, SOLUTION INTRAVENOUS at 14:25

## 2021-01-01 RX ADMIN — BARIUM SULFATE 45 G: 0.6 CREAM ORAL at 14:32

## 2021-01-01 RX ADMIN — LORAZEPAM 1 MG: 2 INJECTION INTRAMUSCULAR; INTRAVENOUS at 23:10

## 2021-01-01 RX ADMIN — ARFORMOTEROL TARTRATE 15 MCG: 15 SOLUTION RESPIRATORY (INHALATION) at 06:16

## 2021-01-01 RX ADMIN — Medication 1000 UNITS: at 20:38

## 2021-01-01 RX ADMIN — Medication 10 ML: at 08:44

## 2021-01-01 RX ADMIN — ROPINIROLE HYDROCHLORIDE 0.5 MG: 0.5 TABLET, FILM COATED ORAL at 20:30

## 2021-01-01 RX ADMIN — PIPERACILLIN AND TAZOBACTAM 3375 MG: 3; .375 INJECTION, POWDER, FOR SOLUTION INTRAVENOUS at 05:40

## 2021-01-01 RX ADMIN — ARFORMOTEROL TARTRATE 15 MCG: 15 SOLUTION RESPIRATORY (INHALATION) at 06:47

## 2021-01-01 RX ADMIN — NYSTATIN 500000 UNITS: 100000 SUSPENSION ORAL at 10:24

## 2021-01-01 RX ADMIN — Medication 10 ML: at 20:18

## 2021-01-01 RX ADMIN — ENOXAPARIN SODIUM 40 MG: 40 INJECTION SUBCUTANEOUS at 08:11

## 2021-01-01 RX ADMIN — Medication 0.5 MG: at 17:30

## 2021-01-01 RX ADMIN — MORPHINE SULFATE 2 MG: 2 INJECTION, SOLUTION INTRAMUSCULAR; INTRAVENOUS at 18:10

## 2021-01-01 RX ADMIN — BUDESONIDE 500 MCG: 0.5 INHALANT RESPIRATORY (INHALATION) at 20:26

## 2021-01-01 RX ADMIN — Medication 10 ML: at 20:31

## 2021-01-01 RX ADMIN — ARFORMOTEROL TARTRATE 15 MCG: 15 SOLUTION RESPIRATORY (INHALATION) at 04:18

## 2021-01-01 RX ADMIN — Medication 0.5 MG: at 20:26

## 2021-01-01 RX ADMIN — ARFORMOTEROL TARTRATE 15 MCG: 15 SOLUTION RESPIRATORY (INHALATION) at 18:24

## 2021-01-01 RX ADMIN — Medication 0.5 MG: at 06:29

## 2021-01-01 RX ADMIN — VERAPAMIL HYDROCHLORIDE 240 MG: 120 TABLET, FILM COATED, EXTENDED RELEASE ORAL at 20:53

## 2021-01-01 RX ADMIN — Medication 0.5 MG: at 09:50

## 2021-01-01 RX ADMIN — ACETAMINOPHEN 650 MG: 650 SUPPOSITORY RECTAL at 08:12

## 2021-01-01 RX ADMIN — HYDROCODONE BITARTRATE AND ACETAMINOPHEN 1 TABLET: 5; 325 TABLET ORAL at 20:57

## 2021-01-01 RX ADMIN — HYDROCODONE BITARTRATE AND ACETAMINOPHEN 1 TABLET: 5; 325 TABLET ORAL at 16:15

## 2021-01-01 RX ADMIN — HYDROCODONE BITARTRATE AND ACETAMINOPHEN 1 TABLET: 5; 325 TABLET ORAL at 15:39

## 2021-01-01 RX ADMIN — SODIUM CHLORIDE 1000 ML: 9 INJECTION, SOLUTION INTRAVENOUS at 15:19

## 2021-01-01 RX ADMIN — BUDESONIDE 500 MCG: 0.5 INHALANT RESPIRATORY (INHALATION) at 04:18

## 2021-01-01 RX ADMIN — METHYLPREDNISOLONE SODIUM SUCCINATE 40 MG: 40 INJECTION, POWDER, FOR SOLUTION INTRAMUSCULAR; INTRAVENOUS at 08:43

## 2021-01-01 RX ADMIN — HYDROCODONE BITARTRATE AND ACETAMINOPHEN 1 TABLET: 5; 325 TABLET ORAL at 15:32

## 2021-01-01 RX ADMIN — Medication 10 ML: at 19:38

## 2021-01-01 RX ADMIN — Medication 10 ML: at 20:40

## 2021-01-01 RX ADMIN — VERAPAMIL HYDROCHLORIDE 120 MG: 120 TABLET, FILM COATED, EXTENDED RELEASE ORAL at 08:12

## 2021-01-01 RX ADMIN — ARFORMOTEROL TARTRATE 15 MCG: 15 SOLUTION RESPIRATORY (INHALATION) at 16:10

## 2021-01-01 RX ADMIN — BARIUM SULFATE 45 G: 0.81 POWDER, FOR SUSPENSION ORAL at 14:31

## 2021-01-01 RX ADMIN — FUROSEMIDE 20 MG: 10 INJECTION, SOLUTION INTRAMUSCULAR; INTRAVENOUS at 20:42

## 2021-01-01 RX ADMIN — Medication 0.5 MG: at 06:47

## 2021-01-01 RX ADMIN — NYSTATIN 500000 UNITS: 100000 SUSPENSION ORAL at 16:56

## 2021-01-01 RX ADMIN — LORAZEPAM 1 MG: 2 INJECTION INTRAMUSCULAR; INTRAVENOUS at 10:31

## 2021-01-01 RX ADMIN — NYSTATIN 500000 UNITS: 100000 SUSPENSION ORAL at 19:37

## 2021-01-01 RX ADMIN — PANTOPRAZOLE SODIUM 40 MG: 40 TABLET, DELAYED RELEASE ORAL at 05:46

## 2021-01-01 RX ADMIN — PREDNISONE 40 MG: 20 TABLET ORAL at 17:07

## 2021-01-01 RX ADMIN — BUDESONIDE 500 MCG: 0.5 INHALANT RESPIRATORY (INHALATION) at 06:02

## 2021-01-01 RX ADMIN — MORPHINE SULFATE 2 MG: 2 INJECTION, SOLUTION INTRAMUSCULAR; INTRAVENOUS at 14:29

## 2021-01-01 RX ADMIN — LORAZEPAM 0.5 MG: 2 INJECTION INTRAMUSCULAR; INTRAVENOUS at 22:49

## 2021-01-01 RX ADMIN — HYDROCODONE BITARTRATE AND ACETAMINOPHEN 1 TABLET: 5; 325 TABLET ORAL at 20:52

## 2021-01-01 RX ADMIN — ACETAMINOPHEN 650 MG: 325 TABLET, FILM COATED ORAL at 13:53

## 2021-01-01 RX ADMIN — BUDESONIDE 500 MCG: 0.5 INHALANT RESPIRATORY (INHALATION) at 16:10

## 2021-01-01 RX ADMIN — Medication 0.5 MG: at 09:42

## 2021-01-01 RX ADMIN — Medication 0.5 MG: at 06:16

## 2021-01-01 RX ADMIN — ROPINIROLE HYDROCHLORIDE 0.5 MG: 0.5 TABLET, FILM COATED ORAL at 20:37

## 2021-01-01 RX ADMIN — TIOTROPIUM BROMIDE 18 MCG: 18 CAPSULE ORAL; RESPIRATORY (INHALATION) at 17:31

## 2021-01-01 RX ADMIN — HYDROCODONE BITARTRATE AND ACETAMINOPHEN 1 TABLET: 5; 325 TABLET ORAL at 08:12

## 2021-01-01 RX ADMIN — METHYLPREDNISOLONE 4 MG: 4 TABLET ORAL at 15:39

## 2021-01-01 RX ADMIN — SODIUM CHLORIDE: 9 INJECTION, SOLUTION INTRAVENOUS at 20:06

## 2021-01-01 RX ADMIN — NYSTATIN 500000 UNITS: 100000 SUSPENSION ORAL at 16:13

## 2021-01-01 RX ADMIN — LORAZEPAM 0.5 MG: 2 INJECTION INTRAMUSCULAR; INTRAVENOUS at 23:34

## 2021-01-01 ASSESSMENT — PAIN DESCRIPTION - PROGRESSION
CLINICAL_PROGRESSION: NOT CHANGED

## 2021-01-01 ASSESSMENT — PAIN SCALES - GENERAL
PAINLEVEL_OUTOF10: 0
PAINLEVEL_OUTOF10: 7
PAINLEVEL_OUTOF10: 8
PAINLEVEL_OUTOF10: 7
PAINLEVEL_OUTOF10: 0
PAINLEVEL_OUTOF10: 8
PAINLEVEL_OUTOF10: 8
PAINLEVEL_OUTOF10: 4
PAINLEVEL_OUTOF10: 6
PAINLEVEL_OUTOF10: 8
PAINLEVEL_OUTOF10: 0
PAINLEVEL_OUTOF10: 3
PAINLEVEL_OUTOF10: 4
PAINLEVEL_OUTOF10: 7
PAINLEVEL_OUTOF10: 9
PAINLEVEL_OUTOF10: 0
PAINLEVEL_OUTOF10: 8
PAINLEVEL_OUTOF10: 7
PAINLEVEL_OUTOF10: 9
PAINLEVEL_OUTOF10: 0
PAINLEVEL_OUTOF10: 8
PAINLEVEL_OUTOF10: 0
PAINLEVEL_OUTOF10: 2

## 2021-01-01 ASSESSMENT — PAIN DESCRIPTION - LOCATION
LOCATION: GENERALIZED
LOCATION: GENERALIZED
LOCATION: BACK;LEG
LOCATION: GENERALIZED

## 2021-01-01 ASSESSMENT — ENCOUNTER SYMPTOMS
BACK PAIN: 0
SORE THROAT: 0
NAUSEA: 0
EYE DISCHARGE: 0
VOMITING: 0
SINUS PRESSURE: 0
EYE REDNESS: 0
WHEEZING: 1
COUGH: 1
SPUTUM PRODUCTION: 1
DIARRHEA: 0
EYE PAIN: 0
ABDOMINAL PAIN: 0
TACHYPNEA: 1
SHORTNESS OF BREATH: 1

## 2021-01-01 ASSESSMENT — PAIN DESCRIPTION - PAIN TYPE
TYPE: CHRONIC PAIN
TYPE: ACUTE PAIN
TYPE: CHRONIC PAIN
TYPE: ACUTE PAIN
TYPE: CHRONIC PAIN
TYPE: CHRONIC PAIN

## 2021-01-01 ASSESSMENT — PAIN DESCRIPTION - ONSET
ONSET: ON-GOING

## 2021-01-01 ASSESSMENT — PAIN DESCRIPTION - DESCRIPTORS
DESCRIPTORS: ACHING;DISCOMFORT
DESCRIPTORS: DISCOMFORT;ACHING
DESCRIPTORS: ACHING;DISCOMFORT
DESCRIPTORS: ACHING
DESCRIPTORS: ACHING;CONSTANT

## 2021-01-01 ASSESSMENT — PAIN DESCRIPTION - FREQUENCY
FREQUENCY: CONTINUOUS
FREQUENCY: INTERMITTENT
FREQUENCY: CONTINUOUS
FREQUENCY: CONTINUOUS

## 2021-01-01 ASSESSMENT — PAIN - FUNCTIONAL ASSESSMENT
PAIN_FUNCTIONAL_ASSESSMENT: ACTIVITIES ARE NOT PREVENTED
PAIN_FUNCTIONAL_ASSESSMENT: ACTIVITIES ARE NOT PREVENTED

## 2021-02-24 PROBLEM — J44.1 COPD EXACERBATION (HCC): Status: ACTIVE | Noted: 2021-01-01

## 2021-02-24 NOTE — ED PROVIDER NOTES
Patient presents with shortness of breath. He does have a diagnosis of COPD that is dealt with for the past 7 to 10 years. He has quit smoking 7 years ago. He mentions that his O2 sats dropped into the high 80s especially at night however he has been unable to get home oxygen. He has been dealing with this for the past 10 -14 days. Patient is fairly sedentary at home. He is not complaining of any chest pain at this time. He has also had some difficulty swallowing solid foods. Has currently been maintaining a soft food diet. He denies any recent illnesses or fevers. He does have a baseline cough. There is also some concern      Shortness of Breath  Severity:  Severe  Onset quality:  Gradual  Duration:  2 weeks  Timing:  Intermittent  Progression:  Worsening  Chronicity:  New  Relieved by:  None tried  Worsened by:  Exertion  Ineffective treatments:  None tried  Associated symptoms: cough, sputum production and wheezing    Associated symptoms: no abdominal pain, no chest pain, no diaphoresis, no ear pain, no fever, no headaches, no rash, no sore throat and no vomiting         Review of Systems   Constitutional: Negative for chills, diaphoresis and fever. HENT: Negative for ear pain, sinus pressure and sore throat. Eyes: Negative for pain, discharge and redness. Respiratory: Positive for cough, sputum production, shortness of breath and wheezing. Cardiovascular: Negative for chest pain, palpitations and leg swelling. Gastrointestinal: Negative for abdominal pain, diarrhea, nausea and vomiting. Genitourinary: Negative for dysuria and frequency. Musculoskeletal: Negative for arthralgias and back pain. Skin: Negative for rash and wound. Neurological: Negative for weakness and headaches. Hematological: Negative for adenopathy. All other systems reviewed and are negative. Physical Exam  Vitals signs and nursing note reviewed.    Constitutional: Appearance: He is well-developed. HENT:      Head: Normocephalic and atraumatic. Eyes:      Conjunctiva/sclera: Conjunctivae normal.   Neck:      Musculoskeletal: Normal range of motion and neck supple. Cardiovascular:      Rate and Rhythm: Normal rate and regular rhythm. Heart sounds: Normal heart sounds. No murmur. Pulmonary:      Effort: Tachypnea, accessory muscle usage and respiratory distress ( Patient also exhibited mild conversational dyspnea) present. Breath sounds: Decreased breath sounds and wheezing present. No rales. Abdominal:      General: Bowel sounds are normal.      Palpations: Abdomen is soft. Tenderness: There is no abdominal tenderness. There is no guarding or rebound. Musculoskeletal:         General: No tenderness or deformity. Right lower leg: No edema. Left lower leg: No edema. Skin:     General: Skin is warm and dry. Coloration: Skin is not cyanotic or pale. Neurological:      Mental Status: He is alert and oriented to person, place, and time. Cranial Nerves: No cranial nerve deficit.       Coordination: Coordination normal.          Procedures     MDM Patient presents to the ED for evaluation of shortness of breath. Has been present for several months but has recently gotten worse. Patient states he feels like he cannot take a deep breath in. He is also been having increasing difficulty getting around and has been bedridden for several months. This has been getting worse. Denies chest pain. Patient's work-up today showed a CBC with no evidence of anemia or leukocytosis. Patient's BMP did show normal electrolytes but there was evidence of renal insufficiency with a BUN of 62 and a creatinine of 2.1. There is no baseline for this. His GFR is 31. His troponin was also elevated at 0.08. This could represent demand ischemia from his dyspnea or could be secondary to the acute renal insufficiency. She does have a Covid test which is pending at this time. His chest x-ray did show findings consistent with moderate emphysema. Cholelithiasis but no evidence of cholecystitis. I was initially also concerned about possibility of a PE since he is sedentary. We attempted to do a CTA but were unable due to his acute renal insufficiency. Discussed this with the admitting physician and discussed that he may possibly need a VQ scan if this is further considered. ED Course as of Feb 24 1758   Wed Feb 24, 2021   1508 Patient has accessory muscle use and mild conversational dyspnea. Breathing treatment has been ordered. [MS]   1513 EKG shows normal sinus rhythm with no ST elevations there are T wave inversions in leads II and III. There are no previous EKGs to compare to. [BB]   1621 Patient resting in bed in no significant distress. States he thinks of breathing treatment did help. Discussed results of labs thus far. Awaiting CT. He reports having no history of kidney disease. [MS]   1726 Repeat EKG shows no acute changes from the prior study.     [MS]      ED Course User Index  [BB] Argenis Trotter DO  [MS] Bronson Wilkins, DO --------------------------------------------- PAST HISTORY ---------------------------------------------  Past Medical History:  has a past medical history of COPD (chronic obstructive pulmonary disease) ([facility] [address] Past Surgical History:  has no past surgical history on file. Social History:  reports that he quit smoking about 10 years ago. He has never used smokeless tobacco. He reports current alcohol use of about 2.0 standard drinks of alcohol per week. He reports that he does not use drugs. Family History: family history is not on file. The patients home medications have been reviewed. Allergies: Patient has no known allergies.     -------------------------------------------------- RESULTS -------------------------------------------------    LABS:  Results for orders placed or performed during the hospital encounter of 02/24/21   CBC Auto Differential   Result Value Ref Range    WBC 5.4 4.5 - 11.5 E9/L    RBC 3.86 3.80 - 5.80 E12/L    Hemoglobin 14.0 12.5 - 16.5 g/dL    Hematocrit 40.8 37.0 - 54.0 %    .7 (H) 80.0 - 99.9 fL    MCH 36.3 (H) 26.0 - 35.0 pg    MCHC 34.3 32.0 - 34.5 %    RDW 13.0 11.5 - 15.0 fL    Platelets 441 202 - 463 E9/L    MPV 10.7 7.0 - 12.0 fL    Neutrophils % 73.0 43.0 - 80.0 %    Lymphocytes % 10.4 (L) 20.0 - 42.0 %    Monocytes % 12.2 (H) 2.0 - 12.0 %    Eosinophils % 2.6 0.0 - 6.0 %    Basophils % 0.9 0.0 - 2.0 %    Neutrophils Absolute 4.05 1.80 - 7.30 E9/L    Lymphocytes Absolute 0.54 (L) 1.50 - 4.00 E9/L    Monocytes Absolute 0.65 0.10 - 0.95 E9/L    Eosinophils Absolute 0.14 0.05 - 0.50 E9/L    Basophils Absolute 0.00 0.00 - 0.20 E9/L    Metamyelocytes Relative 0.9 0.0 - 1.0 %    Myelocyte Percent 0.9 0 - 0 %    Polychromasia 1+     Poikilocytes 1+     [place]erfield Cells 1+     Ovalocytes 1+    Basic Metabolic Panel w/ Reflex to MG   Result Value Ref Range    Sodium 133 132 - 146 mmol/L    Potassium reflex Magnesium 4.4 3.5 - 5.0 mmol/L    Chloride 91 (L) 98 - 107 mmol/L CO2 23 22 - 29 mmol/L    Anion Gap 19 (H) 7 - 16 mmol/L    Glucose 65 (L) 74 - 99 mg/dL    BUN 62 (H) 8 - 23 mg/dL    CREATININE 2.1 (H) 0.7 - 1.2 mg/dL    GFR Non-African American 31 >=60 mL/min/1.73    GFR African American 38     Calcium 8.5 (L) 8.6 - 10.2 mg/dL   Troponin   Result Value Ref Range    Troponin 0.08 (H) 0.00 - 0.03 ng/mL   Lactic Acid, Plasma   Result Value Ref Range    Lactic Acid 2.1 0.5 - 2.2 mmol/L   EKG 12 Lead   Result Value Ref Range    Ventricular Rate 92 BPM    Atrial Rate 92 BPM    P-R Interval 174 ms    QRS Duration 112 ms    Q-T Interval 362 ms    QTc Calculation (Bazett) 447 ms    P Axis 89 degrees    R Axis 84 degrees    T Axis 102 degrees   EKG 12 Lead   Result Value Ref Range    Ventricular Rate 90 BPM    Atrial Rate 90 BPM    P-R Interval 180 ms    QRS Duration 112 ms    Q-T Interval 354 ms    QTc Calculation (Bazett) 433 ms    P Axis 84 degrees    R Axis 90 degrees    T Axis 68 degrees       RADIOLOGY:  CT CHEST WO CONTRAST   Final Result   Moderate emphysema, no acute pulmonary abnormalities. Cholelithiasis, no specific signs of cholecystitis. Mild ectasia ascending thoracic aorta            ------------------------- NURSING NOTES AND VITALS REVIEWED ---------------------------  Date / Time Roomed:  2/24/2021  2:22 PM  ED Bed Assignment:  09/09    The nursing notes within the ED encounter and vital signs as below have been reviewed.      Patient Vitals for the past 24 hrs:   BP Temp Temp src Pulse Resp SpO2 Height Weight   02/24/21 1645    90 28 95 %     02/24/21 1621      98 %     02/24/21 1600 (!) 154/95   90 21 100 %     02/24/21 1545    86 23 97 %     02/24/21 1530    87 (!) 36 100 %     02/24/21 1515    90 17      02/24/21 1433 (!) 120/93 97.6 °F (36.4 °C) Oral        02/24/21 1432    93 20 100 % 5' 10\" (1.778 m) 155 lb (70.3 kg)     EKG Interpretation @ [phone]    Interpreted by emergency department physician    Rhythm: normal sinus Rate: normal  Axis: right  Ectopy: none  Conduction: nonspecific QRS widening of inferior leads  ST Segments: normal  T Waves: normal  Q Waves: none    Clinical Impression: Normal sinus rhythm with corresponding of the inferior leads. Mino Elise     EKG Interpretation @ 5422    Interpreted by emergency department physician    Rhythm: normal sinus   Rate: normal  Axis: right  Ectopy: none  Conduction: Unchanged from previous EKG  ST Segments: no acute change  T Waves: no acute change  Q Waves: none    Clinical Impression: no acute changes    Mino Elise      Oxygen Saturation Interpretation: Normal    ------------------------------------------ PROGRESS NOTES ------------------------------------------  Re-evaluation(s):  Please see ED course above. Counseling:  I have spoken with the patient and discussed todays results, in addition to providing specific details for the plan of care and counseling regarding the diagnosis and prognosis. Their questions are answered at this time and they are agreeable with the plan of admission. There is no pretibial edema nor calf tenderness bilaterally. --------------------------------- ADDITIONAL PROVIDER NOTES ---------------------------------  Consultations:  Time: 0625. Spoke with Dr. Jonathan Aguirre. Discussed case. He will admit the patient. This patient's ED course included: a personal history and physicial examination, re-evaluation prior to disposition, multiple bedside re-evaluations, IV medications, cardiac monitoring, continuous pulse oximetry and complex medical decision making and emergency management    This patient has remained hemodynamically stable during their ED course. Diagnosis:  1. COPD exacerbation ([facility] [address]    2. ASHLEY (acute kidney injury) ([facility]    3. Elevated troponin    4. Calculus of gallbladder without cholecystitis without obstruction        Disposition:  Patient's disposition: Admit to telemetry  Patient's condition is fair. ATTENDING PROVIDER ATTESTATION:     Becki Ceballos presented to the emergency department for evaluation of Shortness of Breath (X Months but worse today, feels like he can not take deep breath)    I have reviewed and discussed the case, including pertinent history (medical, surgical, family and social) and exam findings with the Resident and the Nurse assigned to Becki Ceballos. I have personally performed and/or participated in the history, exam, medical decision making, and procedures and agree with all pertinent clinical information. Patient upon arrival is in mild distress. Mild conversational dyspnea, accessory muscle use, tachypnea. Heart regular rhythm. I have reviewed my findings and recommendations with Becki Ceballos and members of family present at the time of disposition. MDM: Supportive care, will obtain appropriate labs and imaging to assess patient's Shortness of Breath (X Months but worse today, feels like he can not take deep breath)       My findings/plan: The primary encounter diagnosis was COPD exacerbation ([facility] [address] Diagnoses of ASHLEY (acute kidney injury) ([facility] [address] Elevated troponin, and Calculus of gallbladder without cholecystitis without obstruction were also pertinent to this visit.   New Prescriptions    No medications on file     Bronson Wilkins, DO         Bronson Wilkins, DO  02/24/21 [address] [facility], DO  02/24/21 6541

## 2021-02-24 NOTE — ED NOTES
Bed: 09  Expected date:   Expected time:   Means of arrival:   Comments:  Jose Luciano, RN  02/24/21 0897

## 2021-02-24 NOTE — H&P
Department of Internal Medicine  General Internal Medicine  Attending History and Physical      CHIEF COMPLAINT:  Difficulty swallowing    Reason for Admission:  dysphagia    History Obtained From:  patient    HISTORY OF PRESENT ILLNESS:      The patient is a 72 y.o. male with significant past medical history of COPD who presents with dysphagia. This has been long going, but has worsened within the last week. He noted that he was able to eat soft diet without any issue. Currently he is unable to do so. He noted that he is able to ambulate with a walker, but he's been unable to do so in the last 2 days. He noted that his urine appears more concentrated. He denied fever and chills. No chest pain. No nausea or vomiting. He noted poor appetite. Past Medical History:        Diagnosis Date    COPD (chronic obstructive pulmonary disease) ([facility] [address]      Past Surgical History:    History reviewed. No pertinent surgical history. Medications Prior to Admission:    Not in a hospital admission. Allergies:  Patient has no known allergies. Social History:   TOBACCO:   reports that he quit smoking about 10 years ago. He has never used smokeless tobacco.  ETOH:   reports current alcohol use of about 2.0 standard drinks of alcohol per week. Family History:   History reviewed. No pertinent family history.   REVIEW OF SYSTEMS:  CONSTITUTIONAL:  positive for  fatigue and malaise  EYES:  negative for  contacts and glasses  HEENT:  negative for  hearing loss, tinnitus and ear drainage  RESPIRATORY:  positive for  wheezing  CARDIOVASCULAR:  negative for  chest pain, dyspnea, palpitations  GASTROINTESTINAL:  negative for nausea and vomiting  ENDOCRINE:  negative for heat intolerance and cold intolerance  MUSCULOSKELETAL:  positive for  muscle weakness  negative for  arthralgias and joint swelling  NEUROLOGICAL:  negative for dizziness and seizures  BEHAVIOR/PSYCH:  negative for poor appetite and increased appetite  PHYSICAL EXAM:    Vitals:  BP (!) 154/95   Pulse 90   Temp 97.6 °F (36.4 °C) (Oral)   Resp 28   Ht 5' 10\" (1.778 m)   Wt 155 lb (70.3 kg)   SpO2 95%   BMI 22.24 kg/m²     CONSTITUTIONAL:  awake, alert, cooperative, no apparent distress, and appears stated age  EYES:  Lids and lashes normal, pupils equal, round and reactive to light, extra ocular muscles intact, sclera clear, conjunctiva normal  ENT:  normocepalic, without obvious abnormality  NECK:  Supple, symmetrical, trachea midline, no adenopathy, thyroid symmetric, not enlarged and no tenderness, skin normal  HEMATOLOGIC/LYMPHATICS:  no cervical lymphadenopathy  BACK:  Symmetric, no curvature, spinous processes are non-tender on palpation, paraspinous muscles are non-tender on palpation, no costal vertebral tenderness  LUNGS:  wheeze diffuse, diminished breath sounds throughout lungs  CARDIOVASCULAR:  Normal apical impulse, regular rate and rhythm, normal S1 and S2, no S3 or S4, and no murmur noted  ABDOMEN:  No scars, normal bowel sounds, soft, non-distended, non-tender, no masses palpated, no hepatosplenomegally  MUSCULOSKELETAL:  there is no redness, warmth, or swelling of the joints  NEUROLOGIC:  Awake, alert, oriented to name, place and time. Cranial nerves II-XII are grossly intact. Motor is 5 out of 5 bilaterally. Cerebellar finger to nose, heel to shin intact. Sensory is intact.   Babinski down going, Romberg negative, and gait is normal.  SKIN:  no bruising or bleeding    DATA:  CBC:   Lab Results   Component Value Date    WBC 5.4 02/24/2021    RBC 3.86 02/24/2021    HGB 14.0 02/24/2021    HCT 40.8 02/24/2021    .7 02/24/2021    MCH 36.3 02/24/2021    MCHC 34.3 02/24/2021    RDW 13.0 02/24/2021     02/24/2021    MPV 10.7 02/24/2021     CMP:    Lab Results   Component Value Date     02/24/2021    K 4.4 02/24/2021    CL 91 02/24/2021    CO2 23 02/24/2021    BUN 62 02/24/2021    CREATININE 2.1 02/24/2021    GFRAA 38 02/24/2021 LABGLOM 31 02/24/2021    GLUCOSE 65 02/24/2021    CALCIUM 8.5 02/24/2021     ASSESSMENT AND PLAN:        1. COPD exacerbation [facility]):  Continue breathing treatment  Will give IV steroid  Pulmo hygiene  Incentive spirometry    2. Acute vs Chronic Renal Failure: There is no prior labs to compare this to  Will give IV fluid  Suspects that this may be due to dehydration due to patient's poor oral intake  Check BMP in am    3. Dysphagia: denied hx of stroke  This seems to be worsening  Uncertain of patient's etiology and patient's presentation is vague. With constellation of worsening weakness and dysphagia, there is concern for neuro etiology. (   Will go ahead and get MRI of brain and cervical and neck to rule out any neuro etiology  ST consulted    4. Oral thrush: will hold of on treating until patient get ST evaluation    5. Hypertension Essential: resume home medication if able to tolerate    Check Vit b12 and folate, TSh and Vit D.  Check A1C

## 2021-02-25 NOTE — PROGRESS NOTES
SPEECH/LANGUAGE PATHOLOGY  CLINICAL ASSESSMENT OF SWALLOWING FUNCTION    PATIENT NAME:  Olesya Abebe      :  1950      TODAY'S DATE:  2021  ROOM:  [address] [place] [address]    SUMMARY OF EVALUATION    RN cleared Pt for participation in assessment?: yes     DYSPHAGIA DIAGNOSIS:  Clinical indicators consistent with moderate oral and mild pharyngeal dysphagia       DIET RECOMMENDATIONS:  Dental soft (Easy to Chew) solids with  thin liquids          FEEDING RECOMMENDATIONS:     Assistance level:  Set-up is required for all oral intake      Compensatory strategies recommended: Double swallow, Small bites/sips and Alternate solids and liquids    THERAPY RECOMMENDATIONS:      Dysphagia therapy is recommended 3-5 times per week for LOS or when goals are met.      Meal endurance analysis 1-2 sessions to provide diet modification and compensatory strategy implementation due to patient report of dysphagia symptoms during meals, inconsistent episodes of clinical indicators of dysphagia during intake and need to ensure proper implementation of compensatory strategies during PO intake       A Video Swallow Study (MBSS) is recommended and requires a physician order to determine etiology of gagging with some solid items     PAST HISTORY OF DYSPHAGIA?: yes  Diet PRIOR to hospital admission:    Dental soft (Easy to Chew) solids with  thin liquids   COGNITION: Alert & Oriented x 3 and Follows 3 - step directions appropriate for this assessment                PROCEDURE     Oral Peripheral Examination   Adequate lingual/labial strength     Current Respiratory Status   2 liters nasal cannula     Parameters of Speech Production  Respiration:  Adequate for speech production  Quality:   Within functional limits  Intensity: Within functional limits    Volitional Swallow: Present  Volitional Cough:    Present    Consistencies Administered During the Evaluation   Liquids: thin liquid   Solids:  pureed foods and soft solid foods Method of Intake:   straw, spoon  Self fed, Fed by clinician--due to difficulty with hands      Position:   Seated, upright                  RESULTS     Oral Stage: The oral stage of swallowing was within functional limits for puree soft solids and thin. Patient did exhibit gagging inconsistently with solids he reports this is due to not liking crackers. He demonstrated adequate oral control and mastication when he did not gag on item. He thinks the gagging was mainly due to very dry mouth despite drinking an entire cup of water during the assessment. Dentition:  edentulous      Pharyngeal Stage:      No signs of aspiration were noted during this evaluation however, silent aspiration cannot be ruled out at bedside. If silent aspiration is suspected, a Videofluoroscopic Study of Swallowing (MBS) is recommended and requires a physician order. The Speech Language Pathologist (SLP) completed education with the patient regarding results of evaluation. Explained that Speech Pathology intervention is warranted  at this time   Prognosis for improvements is good     This plan will be re-evaluated and revised in 1 week  if warranted. Patient stated goals: Agreed with above,   Treatment goals discussed with Patient   The Patient understand(s) the diagnosis, prognosis and plan of care       CPT code:  74171  bedside swallow eval      [x]The admitting diagnosis and active problem list, as listed below have been reviewed prior to initiation of this evaluation.      ADMITTING DIAGNOSIS: COPD exacerbation ([address] [J44.1]     ACTIVE PROBLEM LIST:   Patient Active Problem List   Diagnosis    COPD exacerbation ([address]       Shanita Daniel MSCCC/SLP  Speech Language Pathologist  [id number]

## 2021-02-25 NOTE — PROGRESS NOTES
Department of Internal Medicine  General Internal Medicine  Attending Progress Note      SUBJECTIVE:    Reports that he is doing well. Noted that he doesn't want to have MRI done due to being claustrophobic. Denied chest pain. No nausea or vomiting.      OBJECTIVE      Medications    Current Facility-Administered Medications: HYDROcodone-acetaminophen (NORCO) 5-325 MG per tablet 1 tablet, 1 tablet, Oral, TID PRN  methylPREDNISolone sodium (SOLU-MEDROL) injection 40 mg, 40 mg, Intravenous, Q12H  ipratropium-albuterol (DUONEB) nebulizer solution 1 ampule, 1 ampule, Inhalation, Q4H PRN  rOPINIRole (REQUIP) tablet 0.5 mg, 0.5 mg, Oral, Nightly  verapamil (CALAN SR) extended release tablet 120 mg, 120 mg, Oral, Daily  Vitamin D (CHOLECALCIFEROL) tablet 1,000 Units, 1,000 Units, Oral, Nightly  sodium chloride flush 0.9 % injection 10 mL, 10 mL, Intravenous, 2 times per day  sodium chloride flush 0.9 % injection 10 mL, 10 mL, Intravenous, PRN  enoxaparin (LOVENOX) injection 40 mg, 40 mg, Subcutaneous, Daily  promethazine (PHENERGAN) tablet 12.5 mg, 12.5 mg, Oral, Q6H PRN **OR** ondansetron (ZOFRAN) injection 4 mg, 4 mg, Intravenous, Q6H PRN  polyethylene glycol (GLYCOLAX) packet 17 g, 17 g, Oral, Daily PRN  acetaminophen (TYLENOL) tablet 650 mg, 650 mg, Oral, Q6H PRN **OR** acetaminophen (TYLENOL) suppository 650 mg, 650 mg, Rectal, Q6H PRN  budesonide (PULMICORT) nebulizer suspension 500 mcg, 0.5 mg, Nebulization, BID **AND** Arformoterol Tartrate (BROVANA) nebulizer solution 15 mcg, 15 mcg, Nebulization, BID  ipratropium (ATROVENT) 0.02 % nebulizer solution 0.5 mg, 0.5 mg, Nebulization, 4x daily  verapamil (CALAN SR) extended release tablet 240 mg, 240 mg, Oral, Nightly  Physical    VITALS:  /61   Pulse 92   Temp 98.2 °F (36.8 °C) (Oral)   Resp 20   Ht 5' 10\" (1.778 m)   Wt 155 lb (70.3 kg)   SpO2 96%   BMI 22.24 kg/m²   CONSTITUTIONAL:  awake, alert, cooperative, no apparent distress, and appears stated age EYES:  Lids and lashes normal, pupils equal, round and reactive to light, extra ocular muscles intact, sclera clear, conjunctiva normal  ENT:  Normocephalic, without obvious abnormality, atraumatic, sinuses nontender on palpation, external ears without lesions, oral pharynx with moist mucus membranes, tonsils without erythema or exudates, gums normal and good dentition. NECK:  Supple, symmetrical, trachea midline, no adenopathy, thyroid symmetric, not enlarged and no tenderness, skin normal  HEMATOLOGIC/LYMPHATICS:  no cervical lymphadenopathy  LUNGS: decreased Lung sounds, mild wheezing  CARDIOVASCULAR:  Normal apical impulse, regular rate and rhythm, normal S1 and S2, no S3 or S4, and no murmur noted  ABDOMEN:  No scars, normal bowel sounds, soft, non-distended, non-tender, no masses palpated, no hepatosplenomegally  MUSCULOSKELETAL:  there is no redness, warmth, or swelling of the joints  NEUROLOGIC:  Awake, alert, oriented to name, place and time. SKIN:  no bruising or bleeding  Data    CBC:   Lab Results   Component Value Date    WBC 5.3 02/25/2021    RBC 3.11 02/25/2021    HGB 11.0 02/25/2021    HCT 33.5 02/25/2021    .7 02/25/2021    MCH 35.4 02/25/2021    MCHC 32.8 02/25/2021    RDW 13.5 02/25/2021     02/25/2021    MPV 10.8 02/25/2021     CMP:    Lab Results   Component Value Date     02/24/2021    K 4.4 02/24/2021    CL 91 02/24/2021    CO2 23 02/24/2021    BUN 62 02/24/2021    CREATININE 2.1 02/24/2021    GFRAA 38 02/24/2021    LABGLOM 31 02/24/2021    GLUCOSE 65 02/24/2021    CALCIUM 8.5 02/24/2021       ASSESSMENT AND PLAN        1.  COPD exacerbation (HCC)  Continue breathing treatment. Added solu-medrol 40 mg BID  pulm hygiene  Continue to monitor  Consult pulmo    2. Dysphagia/dysphonia:  Suspect neuro in origin. ST consulted  Patient refused MRI due to Claustrophobic  Will continue to monitor    3.  Hypertension Essential:  Better controlled since home meds was restarted Currently on Diltiazem    4. Acute Renal Failure:  Continue IV fluid  Renal function is much improved. Will give additional 1 L of IV fluid  BMP in am.  Suspects that this is related to poor oral intake since the onset of dysphagia. 5. Oral Thrush: will start Nystatin Powder    6.  Generalized Weakness: etiology is unknown  Continue to monitor

## 2021-02-25 NOTE — PROGRESS NOTES
Physical Therapy Initial Evaluation    Room #:  8531/1218-01  Patient Name: Georgina Sanders  YOB: 1950  MRN: [id number]    Referring Provider:   Carlos Blair MD     Date of Service: 2/25/2021    Evaluating Physical Therapist: Paola Perez, PT #[id number]       Diagnosis:   COPD exacerbation ([facility] [J44.1]     shortness of breath    Patient Active Problem List   Diagnosis    COPD exacerbation ([facility]        Tentative placement recommendation: Subacute rehab    Equipment recommendation: To be determined      Prior Level of Function: Patient ambulated with wheeled walker or cane but has not been able to last 2 days  Rehab Potential: fair    for baseline    Past medical history:   Past Medical History:   Diagnosis Date    COPD (chronic obstructive pulmonary disease) ([facility]      History reviewed. No pertinent surgical history. Precautions:  Up with assistance, falls and alarm , bilateral finger flexion contractures, marked bilateral lower extremities weakness lacking active terminal extension    SUBJECTIVE:    Social history: Patient lives with spouse   in a apartment  with No steps  to enter 5th floor has elevator Tub shower     Equipment owned: [place], [address] [employer] [employer], [address] [facility] chair and Scooter,       [id number] [facility] Mobility Inpatient   How much difficulty turning over in bed?: A Little  How much difficulty sitting down on / standing up from a chair with arms?: A Lot  How much difficulty moving from lying on back to sitting on side of bed?: A Little  How much help from another person moving to and from a bed to a chair?: A Lot  How much help from another person needed to walk in hospital room?: Total  How much help from another person for climbing 3-5 steps with a railing?: Total  AM-PAC Inpatient Mobility Raw Score : 12  AM-PAC Inpatient T-Scale Score : 35.33  Mobility Inpatient CMS 0-100% Score: 68.66  Mobility Inpatient CMS G-Code Modifier : CL Nursing cleared patient for PT evaluation. The admitting diagnosis and active problem list as listed above have been reviewed prior to the initiation of this evaluation. OBJECTIVE;   Initial Evaluation  Date: 2/25/2021 Treatment Date:     Short Term/ Long Term   Goals   Was pt agreeable to Eval/treatment? Yes    To be met in 3 days   Pain level   0/10        Bed Mobility    Rolling: Not assessed     Supine to sit: Not assessed     Sit to supine: Supervision     Scooting: Minimal assist of 1    Rolling: Independent    Supine to sit: Independent    Sit to supine: Independent    Scooting: Independent     Transfers Sit to stand: Moderate assist of  2  Due to lower extremities weakness, instability   Sit to stand:  Moderate assist of 1 wheeled walker    Ambulation    2 x 2 side steps using  hand held assist with Moderate assist of  2   shuffling steps, knee buckling    20 feet using  wheeled walker with Moderate assist of 1    Stair negotiation: ascended and descended   Not assessed            ROM Within functional limits except bilateral hands, passively bilateral lower extremities within functional limits however unable to actively performed terminal knee extension due to weakness     Increase range of motion 10% of affected joints    Strength BUE:  refer to OT eval  RLE:  3-/5   LLE:  3-/5   Increase strength in affected mm groups by 1/3 grade   Balance Sitting EOB:  fair used bilateral upper extremities for support however able to maintain balance when asked to not use arms, but immediately returns to bilateral upper extremities support with upper extremities adducted,  externally rotated, supination and  wrist extension 90*  Dynamic Standing:  poor +  Sitting EOB:  good -  Dynamic Standing: fair       Patient is Alert & Oriented x person, place, time and situation and follows directions    Sensation:  Patient  denies numbness and tingling     Edema:  no    Endurance: fair       Vitals: room air Blood Pressure at rest   Blood Pressure during session     Heart Rate at rest   Heart Rate during session 97   SPO2 at rest  %  SPO2 during session 96%     Patient education  Patient educated on role of Physical Therapy, risks of immobility, safety and plan of care,  importance of mobility while in hospital , ankle pumps, quad set and glut set for edema control, blood clot prevention and positioning for skin integrity and comfort      Patient response to education:   Pt verbalized understanding Pt demonstrated skill Pt requires further education in this area   Yes Partial Yes      Treatment:  Patient practiced and was instructed/facilitated in the following treatment: Patient seated edge of bed   Sat edge of bed 10 minutes with Supervision  to increase dynamic sitting balance and activity tolerance. stood x 2 with assist of 2, fatigues quickly, able to side step with moderate assist of 2. returned to bed, performed exercises. Therapeutic Exercises:  ankle pumps, quad sets, glut sets and heel slide  x 10-15 reps. At end of session, patient in bed with alarm call light and phone within reach,   all lines and tubes intact, nursing notified. Patient would benefit from continued skilled Physical Therapy to improve functional independence and quality of life. Patient's/ family goals   get stronger        ASSESSMENT: Patient exhibits decreased strength, balance, endurance and range of motion impairing functional mobility, transfers, gait , gait distance and tolerance to activity impaired terminal knee extension due to weakness.       Plan of Care:     -Bed Mobility: Lower extremity exercises  and Trunk control activities   -Sitting Balance: Incorporate reaching activities to activate trunk muscles   -Standing Balance: Perform strengthening exercises in standing to promote motor control with or without upper extremity support  and Instruct patient on adequate base of support to maintain balance -Transfers: Provide instruction on proper hand and foot position for adequate transfer of weight onto lower extremities and use of gait device and Provide stabilization to prevent fall   -Gait: Gait training and Standing activities to improve: base of support, weight shift, weight bearing    -Endurance: Utilize Supervised activities to increase level of endurance to allow for safe functional mobility including transfers and gait     Patient and or family understand(s) diagnosis, prognosis, and plan of care. Frequency of treatments: Patient will be seen  daily. Time in  0929  Time out  0950    Total Treatment Time  8 minutes    Evaluation time includes thorough review of current medical information, gathering information on past medical history/social history and prior level of function, completion of standardized testing/informal observation of tasks, assessment of data, and development of Plan of care and goals.      CPT codes:  Low Complexity PT evaluation (39631)  Therapeutic exercises (93956)   8 minutes  1 unit(s)    Toney Portillo PT

## 2021-02-25 NOTE — PROGRESS NOTES
Pt was educated on IS use, pt was not seen using because pt stated he was SOB during initial education and throughout the day during additional encouragement pt states he was using independently and will continue to use throughout the day but would not use in front of this rn despite multiple attempts of education and encouragement.

## 2021-02-25 NOTE — CARE COORDINATION
SOCIAL WORK / DISCHARGE PLANNING:  COVID neg 2/24. Sw met with pt at bedside to discuss transition to care. Pt reports to reside with spouse in 5th floor apt, elevator access. Pt has number of dme - ww, shower chair, cane, scooter and wheelchair. Pt states that his spouse now has to help him transfer from wheelchair to bed/ commode. Pt does not have home O2, currently wearing 2L. PT/OT rec KULDIP - mod A of 2 for transfers. He is open to rehab and states his spouse will be as well. Hx KULDIP but could not recall the name. Permission to speak with his spouse, Sw attempted to call spouse, Oscar Melara to discuss as well. Voice message is different name but generic message was left to return call. PRECERT would be needed after choices obtained and accepted.                  Electronically signed by JERAMIE Lantigua on 2/25/2021 at 2:57 PM

## 2021-02-25 NOTE — ED NOTES
Report called to [facility] RN. Requesting BGL d/t glucose of 65 on BMP. BGL of 54 for this RN. Patient asymptomatic at this time. Given 2 apple juices.      Gina Schwarz RN  02/24/21 2207

## 2021-02-25 NOTE — PLAN OF CARE
Problem: Skin Integrity:  Goal: Will show no infection signs and symptoms  2/25/2021 1516 by Azam Hernandez RN  Outcome: Met This Shift     Problem: Falls - Risk of:  Goal: Will remain free from falls  2/25/2021 1516 by Azam Hernandez RN  Outcome: Met This Shift     Problem: Falls - Risk of:  Goal: Absence of physical injury  2/25/2021 1516 by Azam Hernandez RN  Outcome: Met This Shift     Problem: Pain:  Description: Pain management should include both nonpharmacologic and pharmacologic interventions.   Goal: Pain level will decrease  Outcome: Met This Shift     Problem: Breathing Pattern - Ineffective:  Goal: Ability to achieve and maintain a regular respiratory rate will improve  Outcome: Met This Shift

## 2021-02-25 NOTE — ED NOTES
In to assess patient. Discussed symptoms and admission. Patient adamantly refusing MRI despite symptoms.       Mickey Leavitt RN  02/24/21 1939

## 2021-02-26 PROBLEM — E44.0 MODERATE PROTEIN-CALORIE MALNUTRITION (HCC): Status: ACTIVE | Noted: 2021-01-01

## 2021-02-26 NOTE — PROGRESS NOTES
Attempted to call dr. Tien Mahoney for CMR notification that pt tele strip reading joshua but pt is keshav for testing. Will try to call back.

## 2021-02-26 NOTE — PROGRESS NOTES
SPEECH/LANGUAGE PATHOLOGY  VIDEOFLUOROSCOPIC STUDY OF SWALLOWING (MBS)    PATIENT NAME:  Caroline Emery      :  1950      TODAY'S DATE:  2021  ROOM:  [facility]/8749-    SUMMARY OF EVALUATION     SUMMARY OF EVALUATION     DYSPHAGIA DIAGNOSIS:  Functional swallow       DIET RECOMMENDATIONS:  Regular consistency solids with  thin liquids--if this is too difficulty to chew he may request change to dental soft      FEEDING RECOMMENDATIONS:     Assistance level:  No assistance needed      Compensatory strategies recommended: reflux precautions   THERAPY RECOMMENDATIONS:      Dysphagia therapy is recommend to review reflux precautions    Patient may benefit from esophagram or GI consult see radiology report    Patient report:  Foods won't go down    Diet prior to admit:    Regular consistency solids with  thin liquids    Communication/Cognition:  Within normal limits                 PROCEDURE       MBSImP Results:   Lip closure for intraoral bolus containment resulted in no labial escape. Tongue control during bolus hold maintained a cohesive bolus held between tongue to palate seal. Bolus preparation and mastication resulted in slow but efficient chewing and mashing. Bolus transport/lingual motion was with brisk slow motion. Oral residue was not observed. Prognosis for improvements is good     This plan will be re-evaluated and revised in 1 week  if warranted. Patient stated goals: Agreed with above,   Treatment goals discussed with Patient   The Patient understand(s) the diagnosis, prognosis and plan of care      Evaluation time includes  review of current medical information, gathering information on past medical history/social history and prior level of function, completion of standardized testing/informal observation of tasks, assessment of data, and development of POC/Goals. CPT code:  96296  dysphagia study    [x]The admitting diagnosis and active problem list, as listed below have been reviewed prior to initiation of this evaluation.      ADMITTING DIAGNOSIS: COPD exacerbation ([facility] [J44.1]     ACTIVE PROBLEM LIST:   Patient Active Problem List   Diagnosis    COPD exacerbation ([facility]    Moderate protein-calorie malnutrition ([facility]       Brenda Hernandez MSCCC/SLP  Speech Language Pathologist  [phone]

## 2021-02-26 NOTE — CARE COORDINATION
SOCIAL WORK / DISCHARGE PLANNING:  COVID neg 2/24. Pt has been out of room for testing each time Sw has attempted to see him. Sw did not receive return call from spouse to discuss [facility] for short term rehab, pt had been agreeable but deferred to spouse to make choices. REQUEST NURSING STAFF CONFIRM CONTACT INFO FOR SPOUSE when she visits as voice mail for number states another male's name. Once choices obtained, referrals can be made for review of acceptance, Keny Meadows will be needed prior to dc. Pt for MRI today as well as MBS.                  Electronically signed by JERAMIE Moran on 2/26/2021 at 3:55 PM

## 2021-02-26 NOTE — PROGRESS NOTES
Department of Internal Medicine  General Internal Medicine  Attending Progress Note      SUBJECTIVE:    Reports that he is feeling better. Denied fever and chills. No chest pain. Agreed to going for MRI.      OBJECTIVE      Medications    Current Facility-Administered Medications: LORazepam (ATIVAN) injection 1 mg, 1 mg, Intravenous, Q6H PRN  HYDROcodone-acetaminophen (NORCO) 5-325 MG per tablet 1 tablet, 1 tablet, Oral, TID PRN  methylPREDNISolone sodium (SOLU-MEDROL) injection 40 mg, 40 mg, Intravenous, Q12H  ipratropium-albuterol (DUONEB) nebulizer solution 1 ampule, 1 ampule, Inhalation, Q4H PRN  rOPINIRole (REQUIP) tablet 0.5 mg, 0.5 mg, Oral, Nightly  verapamil (CALAN SR) extended release tablet 120 mg, 120 mg, Oral, Daily  Vitamin D (CHOLECALCIFEROL) tablet 1,000 Units, 1,000 Units, Oral, Nightly  sodium chloride flush 0.9 % injection 10 mL, 10 mL, Intravenous, 2 times per day  sodium chloride flush 0.9 % injection 10 mL, 10 mL, Intravenous, PRN  enoxaparin (LOVENOX) injection 40 mg, 40 mg, Subcutaneous, Daily  promethazine (PHENERGAN) tablet 12.5 mg, 12.5 mg, Oral, Q6H PRN **OR** ondansetron (ZOFRAN) injection 4 mg, 4 mg, Intravenous, Q6H PRN  polyethylene glycol (GLYCOLAX) packet 17 g, 17 g, Oral, Daily PRN  acetaminophen (TYLENOL) tablet 650 mg, 650 mg, Oral, Q6H PRN **OR** acetaminophen (TYLENOL) suppository 650 mg, 650 mg, Rectal, Q6H PRN  budesonide (PULMICORT) nebulizer suspension 500 mcg, 0.5 mg, Nebulization, BID **AND** Arformoterol Tartrate (BROVANA) nebulizer solution 15 mcg, 15 mcg, Nebulization, BID  ipratropium (ATROVENT) 0.02 % nebulizer solution 0.5 mg, 0.5 mg, Nebulization, 4x daily  verapamil (CALAN SR) extended release tablet 240 mg, 240 mg, Oral, Nightly  Physical    VITALS:  /75   Pulse 98   Temp 97 °F (36.1 °C) (Infrared)   Resp 17   Ht 5' 10\" (1.778 m)   Wt 155 lb (70.3 kg)   SpO2 96%   BMI 22.24 kg/m² CONSTITUTIONAL:  awake, alert, cooperative, no apparent distress, and appears stated age  EYES:  Lids and lashes normal, pupils equal, round and reactive to light, extra ocular muscles intact, sclera clear, conjunctiva normal  ENT:  Normocephalic, without obvious abnormality, atraumatic, sinuses nontender on palpation, external ears without lesions, oral pharynx with moist mucus membranes, tonsils without erythema or exudates, gums normal and good dentition. NECK:  Supple, symmetrical, trachea midline, no adenopathy, thyroid symmetric, not enlarged and no tenderness, skin normal  HEMATOLOGIC/LYMPHATICS:  no cervical lymphadenopathy  BACK:  Symmetric, no curvature, spinous processes are non-tender on palpation, paraspinous muscles are non-tender on palpation, no costal vertebral tenderness  LUNGS:  Mild wheezing  CARDIOVASCULAR:  Normal apical impulse, regular rate and rhythm, normal S1 and S2, no S3 or S4, and no murmur noted  ABDOMEN:  No scars, normal bowel sounds, soft, non-distended, non-tender, no masses palpated, no hepatosplenomegally  MUSCULOSKELETAL:  there is no redness, warmth, or swelling of the joints  NEUROLOGIC:  Awake, alert, oriented to name, place and time.     SKIN:  no bruising or bleeding  Data    CBC:   Lab Results   Component Value Date    WBC 5.3 02/25/2021    RBC 3.11 02/25/2021    HGB 11.0 02/25/2021    HCT 33.5 02/25/2021    .7 02/25/2021    MCH 35.4 02/25/2021    MCHC 32.8 02/25/2021    RDW 13.5 02/25/2021     02/25/2021    MPV 10.8 02/25/2021     CMP:    Lab Results   Component Value Date     02/25/2021    K 4.1 02/25/2021    CL 98 02/25/2021    CO2 22 02/25/2021    BUN 45 02/25/2021    CREATININE 1.4 02/25/2021    GFRAA >60 02/25/2021    LABGLOM 50 02/25/2021    GLUCOSE 91 02/25/2021    PROT 5.3 02/25/2021    LABALBU 3.1 02/25/2021    CALCIUM 8.1 02/25/2021    BILITOT 0.5 02/25/2021    ALKPHOS 91 02/25/2021    AST 78 02/25/2021    ALT 42 02/25/2021 ASSESSMENT AND PLAN        1.  COPD exacerbation ([facility]    2. Moderate protein-calorie malnutrition ([facility] [address]    3. Hypertension Essential    4. Acute Renal Injury    5. Generalized weakness    6.  Dysphagia/Dysphonia:    Plan:  MRI reordered  Patient will receive mild sedation with ativan  Continue to monitor  MBS ordered  Continue breathing treatment  Appreciate Pulm input

## 2021-02-26 NOTE — CONSULTS
PULMONARY MEDICINE CONSULT    Consult requested by: Dr. Maria Eugenia Montero  Reason for consult: COPD exacerbation    HPI  72year old man with PMH of HTN, COPD, as described below admitted to hospital for management of acute hypoxemic respiratory failure due to COPD exacerbation. Per the patient, he is feeling better now, he does not use chronic oxygen. Former smoker. He denies cough, sputum production or change in sputum consistency, no fever or chills.     Review of Systems - History obtained from the patient  General ROS: positive for  - fatigue, malaise and weight gain  Psychological ROS: negative  Ophthalmic ROS: negative for - blurry vision or decreased vision  ENT ROS: negative for - nasal congestion or nasal discharge  Allergy and Immunology ROS: negative for - postnasal drip  Hematological and Lymphatic ROS: negative for - bleeding problems, blood clots or blood transfusions  Endocrine ROS: negative for - polydipsia/polyuria  Breast ROS: negative for breast lumps  Respiratory ROS: positive for - shortness of breath  negative for - cough, hemoptysis or pleuritic pain  Cardiovascular ROS: negative for - chest pain  Gastrointestinal ROS: no abdominal pain, change in bowel habits, or black or bloody stools  Genito-Urinary ROS: no dysuria, trouble voiding, or hematuria  Musculoskeletal ROS: negative for - gait disturbance  Neurological ROS: no TIA or stroke symptoms  Dermatological ROS: negative for - rash    Pt is a former smoker, no alcohol or drug use  Family history of leukemia, breast cancer, COPD and CHF    Last 3 CMP:    Recent Labs     02/24/21  1518 02/25/21  0729 02/26/21  1617    137 132   K 4.4 4.1 3.7   CL 91* 98 94*   CO2 23 22 30*   BUN 62* 45* 28*   CREATININE 2.1* 1.4* 1.0   GLUCOSE 65* 91 192*   CALCIUM 8.5* 8.1* 8.6   PROT  --  5.3* 5.7*   LABALBU  --  3.1* 3.4*   BILITOT  --  0.5 0.4   ALKPHOS  --  91 90   AST  --  78* 47*   ALT  --  42* 40     Recent Labs     02/24/21  1518 02/25/21 0729 02/26/21  1617   TROPONINI 0.08*  --   --    AST  --  78* 47*   ALT  --  42* 40   TRIG  --  91  --      Lab Results   Component Value Date    CHOL 173 02/25/2021    TRIG 91 02/25/2021     02/25/2021    LDLCALC 54 02/25/2021    LABVLDL 18 02/25/2021     No results found for: VITD25    Past Medical History:   Diagnosis Date    COPD (chronic obstructive pulmonary disease) ([facility]      Social History     Socioeconomic History    Marital status:      Spouse name: Not on file    Number of children: Not on file    Years of education: Not on file    Highest education level: Not on file   Occupational History    Not on file   Social Needs    Financial resource strain: Not on file    Food insecurity     Worry: Not on file     Inability: Not on file    Transportation needs     Medical: Not on file     Non-medical: Not on file   Tobacco Use    Smoking status: Former Smoker     Quit date: 2/24/2011     Years since quitting: 10.0    Smokeless tobacco: Never Used   Substance and Sexual Activity    Alcohol use:  Yes     Alcohol/week: 2.0 standard drinks     Types: 2 Shots of liquor per week    Drug use: Never    Sexual activity: Not on file   Lifestyle    Physical activity     Days per week: Not on file     Minutes per session: Not on file    Stress: Not on file   Relationships    Social connections     Talks on phone: Not on file     Gets together: Not on file     Attends [employer] service: Not on file     Active member of club or organization: Not on file     Attends meetings of clubs or organizations: Not on file     Relationship status: Not on file    Intimate partner violence     Fear of current or ex partner: Not on file     Emotionally abused: Not on file     Physically abused: Not on file     Forced sexual activity: Not on file   Other Topics Concern    Not on file   Social History Narrative    Not on file     Current Facility-Administered Medications Medication Dose Route Frequency Provider Last Rate Last Admin    LORazepam (ATIVAN) injection 1 mg  1 mg Intravenous Q6H PRN Maxi Poole MD   1 mg at 02/26/21 0844    HYDROcodone-acetaminophen (NORCO) 5-325 MG per tablet 1 tablet  1 tablet Oral TID PRN Maxi Poole MD   1 tablet at 02/26/21 0625    methylPREDNISolone sodium (SOLU-MEDROL) injection 40 mg  40 mg Intravenous Q12H Maxi Poole MD   40 mg at 02/26/21 0843    ipratropium-albuterol (DUONEB) nebulizer solution 1 ampule  1 ampule Inhalation Q4H PRN Maxi Poole MD        rOPINIRole (REQUIP) tablet 0.5 mg  0.5 mg Oral Nightly Maxi Poole MD   0.5 mg at 02/25/21 2030    verapamil (CALAN SR) extended release tablet 120 mg  120 mg Oral Daily Maxi Poole MD   120 mg at 02/25/21 1057    Vitamin D (CHOLECALCIFEROL) tablet 1,000 Units  1,000 Units Oral Nightly Maxi Poole MD   1,000 Units at 02/25/21 2030    sodium chloride flush 0.9 % injection 10 mL  10 mL Intravenous 2 times per day Maxi Poole MD   10 mL at 02/26/21 0844    sodium chloride flush 0.9 % injection 10 mL  10 mL Intravenous PRN Maxi Poole MD        enoxaparin (LOVENOX) injection 40 mg  40 mg Subcutaneous Daily Maxi Poole MD   40 mg at 02/26/21 0844    promethazine (PHENERGAN) tablet 12.5 mg  12.5 mg Oral Q6H PRN Maxi Poole MD        Or    ondansetron (ZOFRAN) injection 4 mg  4 mg Intravenous Q6H PRN Maxi Poole MD        polyethylene glycol (GLYCOLAX) packet 17 g  17 g Oral Daily PRN Maxi Poole MD        acetaminophen (TYLENOL) tablet 650 mg  650 mg Oral Q6H PRN Maxi Poole MD        Or    acetaminophen (TYLENOL) suppository 650 mg  650 mg Rectal Q6H PRN Maxi Poole MD        budesonide (PULMICORT) nebulizer suspension 500 mcg  0.5 mg Nebulization BID Maxi Poole MD   500 mcg at 02/26/21 4583    And  Arformoterol Tartrate (BROVANA) nebulizer solution 15 mcg  15 mcg Nebulization BID Jessica Mays MD   15 mcg at 02/26/21 0617    ipratropium (ATROVENT) 0.02 % nebulizer solution 0.5 mg  0.5 mg Nebulization 4x daily Jessica Mays MD   0.5 mg at 02/26/21 0617    verapamil (CALAN SR) extended release tablet 240 mg  240 mg Oral Nightly Jessica Mays MD   240 mg at 02/25/21 2030   /66   Pulse 92   Temp 97.2 °F (36.2 °C) (Oral)   Resp 18   Ht 5' 10\" (1.778 m)   Wt 155 lb (70.3 kg)   SpO2 96%   BMI 22.24 kg/m²     General: Awake, oriented to place, time and person  HEENT: No head lesions, PERRL, EOMI, mouth without lesions, no nasal lesions, no cervical adenopathy palpated  Respiratory: Lungs with decreased breath sounds bilaterally, no adventitious sounds auscultated, no accessory muscle use  CV: Regular rate, no murmurs, JVD 2 cm above clavicle, no leg edema  Abdomen: Soft, non tender, + bowel sounds, no lesions  Skin: Hydrated, adequate turgor, no rash, capillary refill <2 seconds  Extremities: Muscular strength 4/5 in 4 limbs, moves 4 limbs spontaneously, distal pulses present  Neurology: Awake and alert, follows commands, moves 4 limbs on command and spontaneously, neck is supple, no meningitic signs present. A/P:  Acute hypoxemic respiratory failure secondary to COPD exacerbation and acute heart failure with pulmonary edema  --Oxygen supplementation to maintain sats > 89%  --Antibiotic regimen:   --Cultures obtained  --Bronchodilators:  Albuterol/iparatropium q 4 hrs + albuterol PRN   --Steroids: Solumedrol 40 mg IV q 6 hrs --> transition to Prednisone 40 mg/day for total 5 days  --Incentive spirometry 10 times/hour while awake  --Diuresis with furosemide 20 mg, BNP is elevated    **For discharge  --Continue with the patient's Symbicort BID and Tiotropium daily  --Exercise oximetry prior to discharge  --Follow up with patient's pulmonologist Right upper lobe scarring vs nodule  --Needs outpatient follow up     Right thyroid nodule  --Needs evaluation as outpatient    DVT prophylaxis with enoxaparin SQ    Rest of supportive care as per primary team    Jennifer Arnett MD  Pulmonary and Critical Care Medicine

## 2021-02-26 NOTE — PLAN OF CARE
Problem: Skin Integrity:  Goal: Will show no infection signs and symptoms  2/26/2021 1745 by Scotty Middleton RN  Outcome: Met This Shift  2/26/2021 1745 by Scotty Middleton RN  Outcome: Met This Shift  Goal: Absence of new skin breakdown  2/26/2021 1745 by Scotty Middleton RN  Outcome: Met This Shift  2/26/2021 1745 by Scotty Middleton RN  Outcome: Met This Shift     Problem: Falls - Risk of:  Goal: Will remain free from falls  2/26/2021 1745 by Scotty Middleton RN  Outcome: Met This Shift  2/26/2021 1745 by Scotty Middleton RN  Outcome: Met This Shift  Goal: Absence of physical injury  Outcome: Met This Shift     Problem: Pain:  Description: Pain management should include both nonpharmacologic and pharmacologic interventions.   Goal: Pain level will decrease  2/26/2021 1745 by Scotty Middleton RN  Outcome: Met This Shift  2/26/2021 1745 by Scotty Middleton RN  Outcome: Met This Shift  Goal: Control of acute pain  2/26/2021 1745 by Scotty Middleton RN  Outcome: Met This Shift  2/26/2021 1745 by Scotty Middleton RN  Outcome: Met This Shift  Goal: Control of chronic pain  Outcome: Met This Shift     Problem: Breathing Pattern - Ineffective:  Goal: Ability to achieve and maintain a regular respiratory rate will improve  2/26/2021 1745 by Scotty Middleton RN  Outcome: Met This Shift  2/26/2021 1745 by Scotty Middleton RN  Outcome: Met This Shift

## 2021-02-26 NOTE — PROGRESS NOTES
Physical Therapy Treatment Note    Room #:  4227/5823-99  Patient Name: Marie Darden  YOB: 1950  MRN: [id number]    Referring Provider:   Chago Graff MD     Date of Service: 2/26/2021    Evaluating Physical Therapist: Saeid Springer, PT #[id number]       Diagnosis:   COPD exacerbation ([facility] [J44.1]     shortness of breath    Patient Active Problem List   Diagnosis    COPD exacerbation ([facility]    Moderate protein-calorie malnutrition ([facility]        Tentative placement recommendation: Subacute rehab    Equipment recommendation: To be determined      Prior Level of Function: Patient ambulated with wheeled walker or cane but has not been able to last 2 days  Rehab Potential: fair    for baseline    Past medical history:   Past Medical History:   Diagnosis Date    COPD (chronic obstructive pulmonary disease) ([facility]      History reviewed. No pertinent surgical history. Precautions:  Up with assistance, falls and alarm , bilateral finger flexion contractures, marked bilateral lower extremities weakness lacking active terminal extension    SUBJECTIVE:    Social history: Patient lives with spouse   in a apartment  with No steps  to enter 5th floor has elevator Tub shower     Equipment owned: Cane, [employer], [employer] chair and Scooter,       [id number] [facility] Mobility Inpatient   How much difficulty turning over in bed?: None  How much difficulty sitting down on / standing up from a chair with arms?: A Lot  How much difficulty moving from lying on back to sitting on side of bed?: None  How much help from another person moving to and from a bed to a chair?: A Lot  How much help from another person needed to walk in hospital room?: Total  How much help from another person for climbing 3-5 steps with a railing?: Total  AM-PAC Inpatient Mobility Raw Score : 14  AM-PAC Inpatient T-Scale Score : 38.1  Mobility Inpatient CMS 0-100% Score: 61.29  Mobility Inpatient CMS G-Code Modifier : CL Nursing cleared patient for PT treatment. OBJECTIVE;   Initial Evaluation  Date: 2/25/2021 Treatment Date:  2/26/2021       Short Term/ Long Term   Goals   Was pt agreeable to Eval/treatment? Yes   yes To be met in 3 days   Pain level   0/10    0/10    Bed Mobility    Rolling: Not assessed     Supine to sit: Not assessed     Sit to supine: Supervision     Scooting: Minimal assist of 1   Rolling: Supervision    Supine to sit: Supervision    Sit to supine: Supervision    Scooting: Supervision     Rolling: Independent    Supine to sit: Independent    Sit to supine: Independent    Scooting: Independent     Transfers Sit to stand: Moderate assist of  2  Due to lower extremities weakness, instability  Sit to stand: Not assessed       Sit to stand:  Moderate assist of 1 wheeled walker    Ambulation    2 x 2 side steps using  hand held assist with Moderate assist of  2   shuffling steps, knee buckling  not assessed     20 feet using  wheeled walker with Moderate assist of 1    Stair negotiation: ascended and descended   Not assessed            ROM Within functional limits except bilateral hands, passively bilateral lower extremities within functional limits however unable to actively performed terminal knee extension due to weakness     Increase range of motion 10% of affected joints    Strength BUE:  refer to OT eval  RLE:  3-/5   LLE:  3-/5   Increase strength in affected mm groups by 1/3 grade   Balance Sitting EOB:  fair used bilateral upper extremities for support however able to maintain balance when asked to not use arms, but immediately returns to bilateral upper extremities support with upper extremities adducted,  externally rotated, supination and  wrist extension 90*  Dynamic Standing:  poor + Sitting EOB: fair using bilateral upper extremities for support  Dynamic Standing: not assessed    Sitting EOB:  good -  Dynamic Standing: fair Patient is Alert & Oriented x person, place, time and situation and follows directions    Sensation:  Patient  denies numbness and tingling     Edema:  no    Endurance: fair       Vitals: 2 liters nasal cannula    Blood Pressure at rest   Blood Pressure during session     Heart Rate at rest   Heart Rate during session   SPO2 at rest 97 %  SPO2 during session 97%     Patient education  Patient educated on role of Physical Therapy, risks of immobility, safety and plan of care,  importance of mobility while in hospital , ankle pumps, quad set and glut set for edema control, blood clot prevention and positioning for skin integrity and comfort      Patient response to education:   Pt verbalized understanding Pt demonstrated skill Pt requires further education in this area   Yes Partial Yes      Treatment:  Patient practiced and was instructed/facilitated in the following treatment: Patient transferred to edge of bed. Pt  Sat edge of bed 22 minutes with Supervision  to increase dynamic sitting balance and activity tolerance. Pt performed seated exercises and returned to supine. Therapeutic Exercises:  ankle pumps, hip abduction/adduction, long arc quad and seated marching  x 15 reps, AROM, 2 sets. V/C for technique. At end of session, patient in bed with alarm call light and phone within reach,   all lines and tubes intact, nursing notified. Patient would benefit from continued skilled Physical Therapy to improve functional independence and quality of life. Patient's/ family goals   get stronger        ASSESSMENT: Patient exhibits decreased strength, balance, endurance and range of motion impairing functional mobility, transfers, gait , gait distance and tolerance to activity. Pt able to perform exercises with AROM. Pt needed to use both arms for support to maintain mid-line position with sitting. Assist x 2 needed for standing for safety.      Plan of Care: -Bed Mobility: Lower extremity exercises  and Trunk control activities   -Sitting Balance: Incorporate reaching activities to activate trunk muscles   -Standing Balance: Perform strengthening exercises in standing to promote motor control with or without upper extremity support  and Instruct patient on adequate base of support to maintain balance  -Transfers: Provide instruction on proper hand and foot position for adequate transfer of weight onto lower extremities and use of gait device and Provide stabilization to prevent fall   -Gait: Gait training and Standing activities to improve: base of support, weight shift, weight bearing    -Endurance: Utilize Supervised activities to increase level of endurance to allow for safe functional mobility including transfers and gait     Patient and or family understand(s) diagnosis, prognosis, and plan of care. Frequency of treatments: Patient will be seen  daily. Time in 8:21  Time out 8:45    Total Treatment Time  24 minutes        CPT codes:    Therapeutic activities (76144)   14 minutes  1 unit(s)  Therapeutic exercises (99740)   10 minutes  1 unit(s)   Gonzalez Al  [facility]  LIC # [id number]

## 2021-02-26 NOTE — PLAN OF CARE
Problem: Skin Integrity:  Goal: Will show no infection signs and symptoms  2/25/2021 2301 by Benoit Snider RN  Outcome: Ongoing  2/25/2021 1516 by Mary Alaniz RN  Outcome: Met This Shift  Goal: Absence of new skin breakdown  Outcome: Ongoing     Problem: Falls - Risk of:  Goal: Will remain free from falls  2/25/2021 2301 by Benoit Snider RN  Outcome: Ongoing  2/25/2021 1516 by Mary Alaniz RN  Outcome: Met This Shift  Goal: Absence of physical injury  2/25/2021 2301 by Benoit Snider RN  Outcome: Ongoing  2/25/2021 1516 by Mary Alaniz RN  Outcome: Met This Shift     Problem: Pain:  Goal: Pain level will decrease  2/25/2021 2301 by Benoit Snider RN  Outcome: Ongoing  2/25/2021 1516 by Mary Alaniz RN  Outcome: Met This Shift  Goal: Control of acute pain  Outcome: Ongoing  Goal: Control of chronic pain  Outcome: Ongoing     Problem: Breathing Pattern - Ineffective:  Goal: Ability to achieve and maintain a regular respiratory rate will improve  2/25/2021 2301 by Benoit Snider RN  Outcome: Ongoing  2/25/2021 1516 by Mary Alaniz RN  Outcome: Met This Shift

## 2021-02-26 NOTE — PROGRESS NOTES
Speech Language Pathology      NAME:  Joanna Bumpers  :  1950  DATE: 2021  ROOM:  [address] [place], [place] [id number]    Patient seen for swallow therapy 15 minutes. patient educated regarding results of MBSS. Reviewed current solid/liquid consistency diet recommendation for   Regular consistency solids with  thin liquids, and discussed compensatory strategies to ensure safe PO intake. Reviewed aspiration precautions. Discussed use of reflux precautions to decrease risk of aspiration with further esophageal workup if physician feels warranted. Patient and or family  indicated understanding of all information provided via satisfactory verbal response.     Patient Active Problem List   Diagnosis    COPD exacerbation ([facility]    Moderate protein-calorie malnutrition ([facility]       [id number]  dysphagia tx    Jackelin Estimable MSCCC/SLP  Speech Language Pathologist  [phone]

## 2021-02-26 NOTE — CONSULTS
Comprehensive Nutrition Assessment    Type and Reason for Visit:  Initial, Consult(Poor intake PTA/Oral supplements)    Nutrition Recommendations/Plan: Continue with diet and start ONS BID Ensure Enlive    Nutrition Assessment:  Pt moderately malnourished AEB poor p.o. intakes PTA, mild fat/muscle wasting, weight over 2week period. Pt with further nutritional compromise 2/2 increased nutrient needs d/t wounds. Will start ONS BID Ensure Enlive. Pt not appropriate for wound healing supplement d.t renal function. Malnutrition Assessment:  Malnutrition Status: Moderate malnutrition    Context:  Chronic Illness     Findings of the 6 clinical characteristics of malnutrition:  Energy Intake:  7 - 75% or less estimated energy requirements for 1 month or longer  Weight Loss:  7 - 5% over 1 month     Body Fat Loss:  1 - Mild body fat loss     Muscle Mass Loss:  1 - Mild muscle mass loss    Fluid Accumulation:  No significant fluid accumulation     Strength:  Not Performed    Estimated Daily Nutrient Needs:  Energy (kcal):  1800-1900kcal/d; Weight Used for Energy Requirements:  Current     Protein (g):  90-105gm pro/d(x1.3-1.5gm/kg);  Weight Used for Protein Requirements:  Current        Fluid (ml/day):  1800-1900ml/d; Method Used for Fluid Requirements:  1 ml/kcal      Nutrition Related Findings:  A/ox4, Abd WDL,, +BS, Edentulous,Renal labs elevated, RLE +1 edema, -I/O -1.1L      Wounds:  Multiple, Skin Tears       Current Nutrition Therapies:    DIET DENTAL SOFT;  Dietary Nutrition Supplements: Standard High Calorie Oral Supplement    Anthropometric Measures:  · Height: 5' 10\" (177.8 cm)  · Current Body Weight: 155 lb (70.3 kg)(2/26)   · Admission Body Weight: 155 lb (70.3 kg)(2/24 stated)    · Usual Body Weight: (per pt report 165-170#)     · Ideal Body Weight: 166 lbs; % Ideal Body Weight 93.4 %   · BMI: 22.2  · Adjusted Body Weight:  ; No Adjustment · BMI Categories: Normal Weight (BMI 22.0 to 24.9) age over 72       Nutrition Diagnosis:   · Moderate malnutrition, In context of chronic illness related to catabolic illness(COPD) as evidenced by intake 0-25%, poor intake prior to admission, wounds, swallow study results, weight loss, mild loss of subcutaneous fat, mild muscle loss, lab values      Nutrition Interventions:   Food and/or Nutrient Delivery:  Continue Current Diet, Start Oral Nutrition Supplement  Nutrition Education/Counseling:  No recommendation at this time   Coordination of Nutrition Care:  Continue to monitor while inpatient    Goals:  Pt to consume >50-75% most meals/ONS       Nutrition Monitoring and Evaluation:   Behavioral-Environmental Outcomes:  None Identified   Food/Nutrient Intake Outcomes:  Food and Nutrient Intake, Supplement Intake  Physical Signs/Symptoms Outcomes:  Biochemical Data, Chewing or Swallowing, Fluid Status or Edema, Nutrition Focused Physical Findings, Skin, Weight     Discharge Planning:     Too soon to determine     Electronically signed by Humberto Dolan RD, LD on 2/26/21 at 7:39 AM EST    Contact: [facility]

## 2021-02-27 NOTE — PROGRESS NOTES
PULMONARY MEDICINE CONSULT    Consult requested by: Dr. Janell Tariq  Reason for consult: COPD exacerbation    HPI  72year old man with PMH of HTN, COPD, as described below admitted to hospital for management of acute hypoxemic respiratory failure due to COPD exacerbation. Per the patient, he is feeling better now, he does not use chronic oxygen. Former smoker. --On 3L NC, feels better    Last 3 CMP:    Recent Labs     02/24/21  1518 02/25/21  0729 02/26/21  1617    137 132   K 4.4 4.1 3.7   CL 91* 98 94*   CO2 23 22 30*   BUN 62* 45* 28*   CREATININE 2.1* 1.4* 1.0   GLUCOSE 65* 91 192*   CALCIUM 8.5* 8.1* 8.6   PROT  --  5.3* 5.7*   LABALBU  --  3.1* 3.4*   BILITOT  --  0.5 0.4   ALKPHOS  --  91 90   AST  --  78* 47*   ALT  --  42* 40     Recent Labs     02/24/21  1518 02/25/21  0729 02/26/21  1617   TROPONINI 0.08*  --   --    AST  --  78* 47*   ALT  --  42* 40   TRIG  --  91  --      Lab Results   Component Value Date    CHOL 173 02/25/2021    TRIG 91 02/25/2021     02/25/2021    LDLCALC 54 02/25/2021    LABVLDL 18 02/25/2021     No results found for: VITD25    Past Medical History:   Diagnosis Date    COPD (chronic obstructive pulmonary disease) ([facility] [address]      Social History     Socioeconomic History    Marital status:      Spouse name: Not on file    Number of children: Not on file    Years of education: Not on file    Highest education level: Not on file   Occupational History    Not on file   Social Needs    Financial resource strain: Not on file    Food insecurity     Worry: Not on file     Inability: Not on file    Transportation needs     Medical: Not on file     Non-medical: Not on file   Tobacco Use    Smoking status: Former Smoker     Quit date: 2/24/2011     Years since quitting: 10.0    Smokeless tobacco: Never Used   Substance and Sexual Activity    Alcohol use:  Yes     Alcohol/week: 2.0 standard drinks     Types: 2 Shots of liquor per week    Drug use: Never  Sexual activity: Not on file   Lifestyle    Physical activity     Days per week: Not on file     Minutes per session: Not on file    Stress: Not on file   Relationships    Social connections     Talks on phone: Not on file     Gets together: Not on file     Attends Gnosticist service: Not on file     Active member of club or organization: Not on file     Attends meetings of clubs or organizations: Not on file     Relationship status: Not on file    Intimate partner violence     Fear of current or ex partner: Not on file     Emotionally abused: Not on file     Physically abused: Not on file     Forced sexual activity: Not on file   Other Topics Concern    Not on file   Social History Narrative    Not on file     Current Facility-Administered Medications   Medication Dose Route Frequency Provider Last Rate Last Admin    perflutren lipid microspheres (DEFINITY) injection 1.65 mg  1.5 mL Intravenous ONCE PRN Jessica Mays MD        nystatin (MYCOSTATIN) 095316 UNIT/ML suspension 500,000 Units  5 mL Oral 4x Daily Jessica Mays MD   500,000 Units at 02/27/21 1221    pantoprazole (PROTONIX) tablet 40 mg  40 mg Oral BID AC Lonnie Jean DO        LORazepam (ATIVAN) injection 1 mg  1 mg Intravenous Q6H PRN Jessica Mays MD   1 mg at 02/26/21 2310    predniSONE (DELTASONE) tablet 40 mg  40 mg Oral Daily Jessenia Benavidez MD   40 mg at 02/27/21 0812    HYDROcodone-acetaminophen ([address]) 5-325 MG per tablet 1 tablet  1 tablet Oral TID PRN Jessica Mays MD   1 tablet at 02/27/21 0812    ipratropium-albuterol (DUONEB) nebulizer solution 1 ampule  1 ampule Inhalation Q4H PRN Jessica Mays MD   1 ampule at 02/26/21 2126    rOPINIRole (REQUIP) tablet 0.5 mg  0.5 mg Oral Nightly Jessica Mays MD   0.5 mg at 02/26/21 2042    verapamil (CALAN SR) extended release tablet 120 mg  120 mg Oral Daily Jessica Mays MD   120 mg at 02/27/21 6649 CV: Regular rate, no murmurs, no JVD, trace leg edema  Abdomen: Soft, non tender, + bowel sounds, no lesions  Skin: Hydrated, adequate turgor, no rash, capillary refill <2 seconds  Extremities: Muscular strength 4/5 in 4 limbs, moves 4 limbs spontaneously, distal pulses present  Neurology: Awake and alert, follows commands, moves 4 limbs on command and spontaneously, neck is supple, no meningitic signs present. A/P:  Acute hypoxemic respiratory failure secondary to COPD exacerbation and acute heart failure with pulmonary edema  --Oxygen supplementation to maintain sats > 89%  --Antibiotic regimen:   --Cultures obtained  --Bronchodilators:  Albuterol/iparatropium q 4 hrs + albuterol PRN   --Steroids:  Prednisone 40 mg/day for total 5 days  --Incentive spirometry 10 times/hour while awake  --Diuresis for agoal of -2L  --Consider involving cardiology for evaluation of \"ischemia vs constrictive pericarditis\" described in echocardiogram      **For discharge  --Continue with the patient's Symbicort BID and Tiotropium daily  --Exercise oximetry prior to discharge  --Follow up with patient's pulmonologist       Right upper lobe scarring vs nodule  --Needs outpatient follow up     Right thyroid nodule  --Needs evaluation as outpatient    DVT prophylaxis with enoxaparin SQ    Rest of supportive care as per primary team. I discussed the prior with Dr. South Harrington MD  Pulmonary and Critical Care Medicine

## 2021-02-27 NOTE — PROGRESS NOTES
Update called to wife Sarah Najera, no answer, general message left.  Electronically signed by Sandra Still RN on 2/27/2021 at 4:06 PM

## 2021-02-27 NOTE — PLAN OF CARE
Problem: Skin Integrity:  Goal: Will show no infection signs and symptoms  Description: Will show no infection signs and symptoms  2/26/2021 2256 by Willy Calderon RN  Outcome: Met This Shift     Problem: Skin Integrity:  Goal: Absence of new skin breakdown  Description: Absence of new skin breakdown  2/26/2021 2256 by Willy Calderon RN  Outcome: Met This Shift     Problem: Falls - Risk of:  Goal: Will remain free from falls  Description: Will remain free from falls  2/26/2021 2256 by Willy Calderon RN  Outcome: Met This Shift     Problem: Falls - Risk of:  Goal: Absence of physical injury  Description: Absence of physical injury  2/26/2021 2256 by Willy Calderon RN  Outcome: Met This Shift     Problem: Pain:  Goal: Pain level will decrease  Description: Pain level will decrease  2/26/2021 2256 by Willy Calderon RN  Outcome: Met This Shift     Problem: Pain:  Goal: Control of acute pain  Description: Control of acute pain  2/26/2021 2256 by Willy Calderon RN  Outcome: Met This Shift     Problem: Pain:  Goal: Control of chronic pain  Description: Control of chronic pain  2/26/2021 2256 by Willy Calderon RN  Outcome: Met This Shift     Problem: Breathing Pattern - Ineffective:  Goal: Ability to achieve and maintain a regular respiratory rate will improve  Description: Ability to achieve and maintain a regular respiratory rate will improve  2/26/2021 2256 by Willy Calderon RN  Outcome: Met This Shift

## 2021-02-27 NOTE — PROGRESS NOTES
Dr. Damien Nayak notified of esophogram planned for Monday. 'ok' per dr. Damien Nayak, no new orders receioved.

## 2021-02-27 NOTE — CONSULTS
Patricia Mart M.D., Dr. Teddy Schaumann, M.D., Kimani Baeza D.O.,   Nga Sims M.D., Carlos Eduardo Ambrosio D.O., and Dr. Vazquez Swanson M.D. Dictated #[id number]    Re: Dysphagai, Anemia  Requesting physician: Dr. Romano Hash  Date:8:43 AM 2/27/2021    Presybeterian Chuy  72 y.o.  male    PE:  /63   Pulse 88   Temp 97.1 °F (36.2 °C) (Infrared)   Resp 18   Ht 5' 10\" (1.778 m)   Wt 155 lb (70.3 kg)   SpO2 97%   BMI 22.24 kg/m²     ASSESSMENT/PLAN:  1. Dysphagia -- pt on regular diet this am.  MBS reviewed. Plan for barium esophagram.  Pt also for heart echo. Pending results possible EGD Monday inpt vs outpt. 2.  Anemia -- pt never had colonoscopy and did d/w pt possible colon polyps, cancer, or bleeding source and will need colonoscopy but can likely be done as outpt which pt agreeable to.     See dictation for full consultation      ST. ELVIA DENG DO  2/27/2021  8:43 AM

## 2021-02-27 NOTE — PLAN OF CARE
Problem: Skin Integrity:  Goal: Will show no infection signs and symptoms  2/27/2021 1249 by Nola Chavez RN  Outcome: Met This Shift     Problem: Skin Integrity:  Goal: Absence of new skin breakdown  2/27/2021 1249 by Nola Chavez RN  Outcome: Met This Shift     Problem: Falls - Risk of:  Goal: Will remain free from falls  2/27/2021 1249 by Nola Chavez RN  Outcome: Met This Shift     Problem: Falls - Risk of:  Goal: Absence of physical injury  2/27/2021 1249 by Nola Chavez RN  Outcome: Met This Shift     Problem: Pain:  Description: Pain management should include both nonpharmacologic and pharmacologic interventions. Goal: Pain level will decrease  2/27/2021 1249 by Nola Chavez RN  Outcome: Met This Shift     Problem: Pain:  Description: Pain management should include both nonpharmacologic and pharmacologic interventions.   Goal: Control of acute pain  2/27/2021 1249 by Nola Chavez RN  Outcome: Met This Shift

## 2021-02-27 NOTE — CONSULTS
[address]                                  CONSULTATION    PATIENT NAME: Odette Esparza                    :        1950  MED REC NO:   [id number]                            ROOM:       [id number]  ACCOUNT NO:   [de-identified]                           ADMIT DATE: 2021  PROVIDER:     Marychuy Roberts DO    CONSULT DATE:  2021    GASTROENTEROLOGY CONSULT    REASON FOR CONSULTATION:  Dysphagia and anemia. HISTORY OF PRESENT ILLNESS:  The patient is a 62-year-old male admitted  on 2021 for a COPD exacerbation, trouble breathing, and some  dysphagia. The patient underwent an MRI of the brain without contrast.   There was no acute abnormality. Nothing to suggest an acute infarct. CT chest showed moderate emphysema with no obvious esophageal mass or  thickening noted. The patient had a modified barium swallow suggesting  swallowing mechanism grossly within normal limits; however, there was  some stasis of contrast in the esophagus and recommended esophagogram  for further evaluation of the distal esophagus to rule out lesion. The  patient does have a mild anemia of 11.6, after hydration it was  eventually 14.0 on admission, however. The patient states he has never  had an EGD or colonoscopy before. He has had the dysphagia going on for  about two months roughly now. Denies any significant heartburn or  reflux. No known history of gastrointestinal malignancies in his family  he is aware of. No gross outward bleeding, otherwise appeared stable. PAST MEDICAL HISTORY:  Significant for COPD and it appears,  hypertension. PAST SURGICAL HISTORY:  The patient denies any surgeries. He has never  had an EGD or colonoscopy. ALLERGIES:  The patient has no known drug allergies.     MEDICATIONS:  From home included Spiriva, Symbicort, Ventolin inhaler, Norco p.r.n., Hyzaar, verapamil, ReQuip, and some multivitamins and  vitamin D.    SOCIAL HISTORY:  The patient states he quit smoking about 10 years ago. The patient does state he drinks three to four days per week. No  history of liver problems. No illicit substances. FAMILY HISTORY:  He denies any gastrointestinal malignancies or liver  problems that he admits to. REVIEW OF SYSTEMS:  Essentially negative other than HPI. Denies any  fever, chills, night sweats, weight changes, headaches, or visual  changes. The dysphagia per HPI. Has a shortness of breath. No active  chest pain. Denies constipation or outward bleeding. No acute  musculoskeletal complaints otherwise. PHYSICAL EXAMINATION:  VITAL SIGNS:  Include blood pressure 112/63, pulse 88, temperature is  97.1, respirations 18, pulse ox 97%. GENERAL:  The patient is a 55-year-old male, appearing his stated age,  in no acute distress, alert and oriented, pleasant and cooperative. HEENT:  Grossly normal.  No scleral icterus is noted. HEART:  Regular rate and rhythm. LUNGS:  Coarse bilaterally. No active wheezes. ABDOMEN:  Soft, nontender. Bowel sounds are positive. EXTREMITIES:  No significant edema. Labs and CT were reviewed, some per HPI and imaging. ASSESSMENT AND PLAN:  1. Dysphagia. The patient was admitted on 02/24/2021, was consulted on  the morning of 02/27/2021. At the time of my exam, the patient is on a  regular diet this morning, reviewed with a modified barium swallowing  symptoms. We will plan for a barium esophagram.  I see the patient is  also planned for 2D echo of his heart, which will need clearance prior  to any EGD. Anyhow, I discussed with the patient pending these results,  potentially an EGD on Monday, sooner if emergent or outpatient pending  overall status. He does not have any signs of impaction at this point,  tolerating secretions.

## 2021-02-27 NOTE — PROGRESS NOTES
Department of Internal Medicine  General Internal Medicine  Attending Progress Note      SUBJECTIVE:    Reports that he feels much better today. I discussed with him the possibility of discharging him on home oxygen. He is agreeable to this. I discussed all labs and imaging finding. He is aware of plan to have him see GI for evaluation of possible narrowing on mid esophagus. I also discussed with him the need for echocardiogram to evaluate his cardiac function.     OBJECTIVE      Medications    Current Facility-Administered Medications: perflutren lipid microspheres (DEFINITY) injection 1.65 mg, 1.5 mL, Intravenous, ONCE PRN  LORazepam (ATIVAN) injection 1 mg, 1 mg, Intravenous, Q6H PRN  predniSONE (DELTASONE) tablet 40 mg, 40 mg, Oral, Daily  HYDROcodone-acetaminophen (NORCO) 5-325 MG per tablet 1 tablet, 1 tablet, Oral, TID PRN  ipratropium-albuterol (DUONEB) nebulizer solution 1 ampule, 1 ampule, Inhalation, Q4H PRN  rOPINIRole (REQUIP) tablet 0.5 mg, 0.5 mg, Oral, Nightly  verapamil (CALAN SR) extended release tablet 120 mg, 120 mg, Oral, Daily  Vitamin D (CHOLECALCIFEROL) tablet 1,000 Units, 1,000 Units, Oral, Nightly  sodium chloride flush 0.9 % injection 10 mL, 10 mL, Intravenous, 2 times per day  sodium chloride flush 0.9 % injection 10 mL, 10 mL, Intravenous, PRN  enoxaparin (LOVENOX) injection 40 mg, 40 mg, Subcutaneous, Daily  promethazine (PHENERGAN) tablet 12.5 mg, 12.5 mg, Oral, Q6H PRN **OR** ondansetron (ZOFRAN) injection 4 mg, 4 mg, Intravenous, Q6H PRN  polyethylene glycol (GLYCOLAX) packet 17 g, 17 g, Oral, Daily PRN  acetaminophen (TYLENOL) tablet 650 mg, 650 mg, Oral, Q6H PRN **OR** acetaminophen (TYLENOL) suppository 650 mg, 650 mg, Rectal, Q6H PRN  budesonide (PULMICORT) nebulizer suspension 500 mcg, 0.5 mg, Nebulization, BID **AND** Arformoterol Tartrate (BROVANA) nebulizer solution 15 mcg, 15 mcg, Nebulization, BID ipratropium (ATROVENT) 0.02 % nebulizer solution 0.5 mg, 0.5 mg, Nebulization, 4x daily  verapamil (CALAN SR) extended release tablet 240 mg, 240 mg, Oral, Nightly  Physical    VITALS:  /63   Pulse 88   Temp 97.1 °F (36.2 °C) (Infrared)   Resp 18   Ht 5' 10\" (1.778 m)   Wt 155 lb (70.3 kg)   SpO2 97%   BMI 22.24 kg/m²   CONSTITUTIONAL:  awake, alert, cooperative, no apparent distress, and appears stated age  EYES:  Lids and lashes normal, pupils equal, round and reactive to light, extra ocular muscles intact, sclera clear, conjunctiva normal  ENT:  normocepalic, without obvious abnormality  NECK:  Supple, symmetrical, trachea midline, no adenopathy, thyroid symmetric, not enlarged and no tenderness, skin normal  HEMATOLOGIC/LYMPHATICS:  no cervical lymphadenopathy  BACK:  Symmetric, no curvature, spinous processes are non-tender on palpation, paraspinous muscles are non-tender on palpation, no costal vertebral tenderness  LUNGS:  diminished breath sounds throughout lungs with mild wheezingCARDIOVASCULAR:  Normal apical impulse, regular rate and rhythm, normal S1 and S2, no S3 or S4, and no murmur noted  ABDOMEN:  No scars, normal bowel sounds, soft, non-distended, non-tender, no masses palpated, no hepatosplenomegally  MUSCULOSKELETAL:  there is no redness, warmth, or swelling of the joints  NEUROLOGIC:  Awake, alert, oriented to name, place and time.     SKIN:  no bruising or bleeding  Data    CBC:   Lab Results   Component Value Date    WBC 3.9 02/26/2021    RBC 3.23 02/26/2021    HGB 11.6 02/26/2021    HCT 34.2 02/26/2021    .9 02/26/2021    MCH 35.9 02/26/2021    MCHC 33.9 02/26/2021    RDW 12.9 02/26/2021     02/26/2021    MPV 10.8 02/26/2021     BMP:    Lab Results   Component Value Date     02/26/2021    K 3.7 02/26/2021    K 4.1 02/25/2021    CL 94 02/26/2021    CO2 30 02/26/2021    BUN 28 02/26/2021    LABALBU 3.4 02/26/2021    CREATININE 1.0 02/26/2021 CALCIUM 8.6 02/26/2021    GFRAA >60 02/26/2021    LABGLOM >60 02/26/2021    GLUCOSE 192 02/26/2021       ASSESSMENT AND PLAN        1.  COPD exacerbation ([facility] [address]):  Continue breathing treatments  Continue steroid taper for more recommendation. Currently on budesonide and Brovana. 2.  Moderate protein-calorie malnutrition ([address]):  Supplement diet Per nutritional recommendation. 3.  Acute respiratory failure with hypoxia due to #1. Continue supplemental oxygen  Ambulatory pulse ox check before discharge  Patient will qualify for home oxygen and will arrange for this. 4.  ASHLEY: Resolved. 5.  Dysphagia/dysphonia:  Modified barium swallow test essentially normal except for concern of incomplete passage of food around the mid esophagus. Recommended esophagram.  However decided to consult GI for concern of stricture as this might warrant dilatation. Consult GI. 6.  Hypertension essential: Well-controlled. Continue current medication. 7.  Generalized weakness/debility:  No discernible etiology found however will continue PT OT. Encourage ambulation. 8.  Elevated BNP:   No overt sign of fluid overload. Mild elevated BNP. Check echocardiogram to evaluate concern for early sign of congestive heart failure. Currently will not continue IV Lasix. 9.  Oral thrush: This may be related to breathing treatment use. Nystatin solution swish and swallow ordered.

## 2021-02-28 NOTE — PLAN OF CARE
Problem: Skin Integrity:  Goal: Will show no infection signs and symptoms  2/28/2021 1844 by Kosta Bullard RN  Outcome: Met This Shift     Problem: Falls - Risk of:  Goal: Will remain free from falls  2/28/2021 1844 by Kosta Bullard RN  Outcome: Met This Shift

## 2021-02-28 NOTE — PROGRESS NOTES
Department of Internal Medicine  General Internal Medicine  Attending Progress Note      SUBJECTIVE:    Reports that he's feeling better. Denied fever and chills. No chest pain.      OBJECTIVE      Medications    Current Facility-Administered Medications: perflutren lipid microspheres (DEFINITY) injection 1.65 mg, 1.5 mL, Intravenous, ONCE PRN  nystatin (MYCOSTATIN) 398831 UNIT/ML suspension 500,000 Units, 5 mL, Oral, 4x Daily  pantoprazole (PROTONIX) tablet 40 mg, 40 mg, Oral, BID AC  LORazepam (ATIVAN) injection 1 mg, 1 mg, Intravenous, Q6H PRN  predniSONE (DELTASONE) tablet 40 mg, 40 mg, Oral, Daily  HYDROcodone-acetaminophen (NORCO) 5-325 MG per tablet 1 tablet, 1 tablet, Oral, TID PRN  ipratropium-albuterol (DUONEB) nebulizer solution 1 ampule, 1 ampule, Inhalation, Q4H PRN  rOPINIRole (REQUIP) tablet 0.5 mg, 0.5 mg, Oral, Nightly  verapamil (CALAN SR) extended release tablet 120 mg, 120 mg, Oral, Daily  Vitamin D (CHOLECALCIFEROL) tablet 1,000 Units, 1,000 Units, Oral, Nightly  sodium chloride flush 0.9 % injection 10 mL, 10 mL, Intravenous, 2 times per day  sodium chloride flush 0.9 % injection 10 mL, 10 mL, Intravenous, PRN  enoxaparin (LOVENOX) injection 40 mg, 40 mg, Subcutaneous, Daily  promethazine (PHENERGAN) tablet 12.5 mg, 12.5 mg, Oral, Q6H PRN **OR** ondansetron (ZOFRAN) injection 4 mg, 4 mg, Intravenous, Q6H PRN  polyethylene glycol (GLYCOLAX) packet 17 g, 17 g, Oral, Daily PRN  acetaminophen (TYLENOL) tablet 650 mg, 650 mg, Oral, Q6H PRN **OR** acetaminophen (TYLENOL) suppository 650 mg, 650 mg, Rectal, Q6H PRN  budesonide (PULMICORT) nebulizer suspension 500 mcg, 0.5 mg, Nebulization, BID **AND** Arformoterol Tartrate (BROVANA) nebulizer solution 15 mcg, 15 mcg, Nebulization, BID  ipratropium (ATROVENT) 0.02 % nebulizer solution 0.5 mg, 0.5 mg, Nebulization, 4x daily  verapamil (CALAN SR) extended release tablet 240 mg, 240 mg, Oral, Nightly  Physical VITALS:  BP (!) 117/58   Pulse 89   Temp 97.9 °F (36.6 °C) (Oral)   Resp 17   Ht 5' 10\" (1.778 m)   Wt 163 lb 6.4 oz (74.1 kg)   SpO2 97%   BMI 23.45 kg/m²   CONSTITUTIONAL:  awake, alert, cooperative, no apparent distress, and appears stated age  EYES:  Lids and lashes normal, pupils equal, round and reactive to light, extra ocular muscles intact, sclera clear, conjunctiva normal  ENT:  Normocephalic, without obvious abnormality, atraumatic, sinuses nontender on palpation, external ears without lesions, oral pharynx with moist mucus membranes, tonsils without erythema or exudates, gums normal and good dentition. BACK:  Symmetric, no curvature, spinous processes are non-tender on palpation, paraspinous muscles are non-tender on palpation, no costal vertebral tenderness  LUNGS: Diminished lung sounds with mild wheezing. CARDIOVASCULAR:  Normal apical impulse, regular rate and rhythm, normal S1 and S2, no S3 or S4, and no murmur noted  ABDOMEN:  No scars, normal bowel sounds, soft, non-distended, non-tender, no masses palpated, no hepatosplenomegally  MUSCULOSKELETAL:  there is no redness, warmth, or swelling of the joints  NEUROLOGIC:  Awake, alert, oriented to name, place and time.     SKIN:  no bruising or bleeding  Data    CBC:   Lab Results   Component Value Date    WBC 7.4 02/28/2021    RBC 3.04 02/28/2021    HGB 10.9 02/28/2021    HCT 33.2 02/28/2021    .2 02/28/2021    MCH 35.9 02/28/2021    MCHC 32.8 02/28/2021    RDW 13.2 02/28/2021     02/28/2021    MPV 11.2 02/28/2021     CMP:    Lab Results   Component Value Date     02/26/2021    K 3.7 02/26/2021    K 4.1 02/25/2021    CL 94 02/26/2021    CO2 30 02/26/2021    BUN 28 02/26/2021    CREATININE 1.0 02/26/2021    GFRAA >60 02/26/2021    LABGLOM >60 02/26/2021    GLUCOSE 192 02/26/2021    PROT 5.7 02/26/2021    LABALBU 3.4 02/26/2021    CALCIUM 8.6 02/26/2021    BILITOT 0.4 02/26/2021    ALKPHOS 90 02/26/2021    AST 47 02/26/2021

## 2021-02-28 NOTE — PROGRESS NOTES
PULMONARY MEDICINE CONSULT    Consult requested by: Dr. Malou Mohan  Reason for consult: COPD exacerbation    HPI  72year old man with PMH of HTN, COPD, as described below admitted to hospital for management of acute hypoxemic respiratory failure due to COPD exacerbation and possible cardiac ischemia vs constrictive pericarditis. --On 2.5 L NC  --Feels better today    Last 3 CMP:    Recent Labs     02/26/21  1617 02/28/21  0704    139   K 3.7 3.6   CL 94* 100   CO2 30* 30*   BUN 28* 26*   CREATININE 1.0 1.0   GLUCOSE 192* 98   CALCIUM 8.6 8.4*   PROT 5.7*  --    LABALBU 3.4*  --    BILITOT 0.4  --    ALKPHOS 90  --    AST 47*  --    ALT 40  --      Recent Labs     02/26/21  1617   AST 47*   ALT 40     Lab Results   Component Value Date    CHOL 173 02/25/2021    TRIG 91 02/25/2021     02/25/2021    LDLCALC 54 02/25/2021    LABVLDL 18 02/25/2021     No results found for: VITD25    Past Medical History:   Diagnosis Date    COPD (chronic obstructive pulmonary disease) ([facility] [address]      Social History     Socioeconomic History    Marital status:      Spouse name: Not on file    Number of children: Not on file    Years of education: Not on file    Highest education level: Not on file   Occupational History    Not on file   Social Needs    Financial resource strain: Not on file    Food insecurity     Worry: Not on file     Inability: Not on file    Transportation needs     Medical: Not on file     Non-medical: Not on file   Tobacco Use    Smoking status: Former Smoker     Quit date: 2/24/2011     Years since quitting: 10.0    Smokeless tobacco: Never Used   Substance and Sexual Activity    Alcohol use:  Yes     Alcohol/week: 2.0 standard drinks     Types: 2 Shots of liquor per week    Drug use: Never    Sexual activity: Not on file   Lifestyle    Physical activity     Days per week: Not on file     Minutes per session: Not on file    Stress: Not on file   Relationships    Social connections

## 2021-02-28 NOTE — PLAN OF CARE
Problem: Skin Integrity:  Goal: Will show no infection signs and symptoms  Outcome: Met This Shift  Goal: Absence of new skin breakdown  Outcome: Met This Shift     Problem: Falls - Risk of:  Goal: Will remain free from falls  Outcome: Met This Shift  Goal: Absence of physical injury  Outcome: Met This Shift     Problem: Pain:  Description: Pain management should include both nonpharmacologic and pharmacologic interventions.   Goal: Pain level will decrease  Outcome: Met This Shift  Goal: Control of acute pain  Outcome: Met This Shift  Goal: Control of chronic pain  Outcome: Met This Shift     Problem: Breathing Pattern - Ineffective:  Goal: Ability to achieve and maintain a regular respiratory rate will improve  Outcome: Met This Shift

## 2021-02-28 NOTE — PROGRESS NOTES
Physical Therapy Treatment Note    Room #:  0153/5317-39  Patient Name: Leyla Macedo  YOB: 1950  MRN: [id number]    Referring Provider: Andrew Loco MD   Date of Service: 2/28/2021  Evaluating Physical Therapist: Airam Glover, PT #[id number]       Diagnosis: COPD exacerbation ([facility] [J44.1] shortness of breath    Patient Active Problem List   Diagnosis    COPD exacerbation ([facility]    Moderate protein-calorie malnutrition ([facility]      Tentative placement recommendation: Subacute rehab    Equipment recommendation: To be determined      Prior Level of Function: Patient ambulated with wheeled walker or cane but has not been able to last 2 days  Rehab Potential: fair  for baseline    Past medical history:   Past Medical History:   Diagnosis Date    COPD (chronic obstructive pulmonary disease) ([facility]      History reviewed. No pertinent surgical history. Precautions:  Up with assistance, falls and alarm, bilateral finger flexion contractures, marked bilateral lower extremities weakness lacking active terminal extension    SUBJECTIVE:  Social history: Patient lives with spouse in a apartment with No steps, to enter 5th floor, has elevator, Tub shower  Equipment owned: [employer], [address] [facility] chair and Scooter,       [address] [place] [address] Mobility Inpatient   How much difficulty turning over in bed?: A Little  How much difficulty sitting down on / standing up from a chair with arms?: A Lot  How much difficulty moving from lying on back to sitting on side of bed?: A Little  How much help from another person moving to and from a bed to a chair?: A Lot  How much help from another person needed to walk in hospital room?: A Lot  How much help from another person for climbing 3-5 steps with a railing?: Total  AM-PAC Inpatient Mobility Raw Score : 13  AM-PAC Inpatient T-Scale Score : 36.74  Mobility Inpatient CMS 0-100% Score: 64.91  Mobility Inpatient CMS G-Code Modifier : CL Nursing cleared patient for PT treatment. OBJECTIVE;   Initial Evaluation  Date: 2/25/2021 Treatment Date:  2/28/2021       Short Term/ Long Term   Goals   Was pt agreeable to Eval/treatment? Yes   Yes To be met in 3 days   Pain level   0/10    0/10    Bed Mobility    Rolling: Not assessed     Supine to sit: Not assessed     Sit to supine: Supervision     Scooting: Minimal assist of 1   Rolling: Not assessed    Supine to sit: Supervision    Sit to supine: Supervision    Scooting: Supervision     Rolling: Independent    Supine to sit: Independent    Sit to supine: Independent    Scooting: Independent     Transfers Sit to stand: Moderate assist of  2  Due to lower extremities weakness, instability  Sit to stand: Not assessed       Sit to stand:  Moderate assist of 1 wheeled walker    Ambulation    2 x 2 side steps using  hand held assist with Moderate assist of  2   shuffling steps, knee buckling  not assessed     20 feet using  wheeled walker with Moderate assist of 1    Stair negotiation: ascended and descended   Not assessed    Not assessed        ROM Within functional limits except bilateral hands, passively bilateral lower extremities within functional limits however unable to actively performed terminal knee extension due to weakness     Increase range of motion 10% of affected joints    Strength BUE:  refer to OT eval  RLE:  3-/5   LLE:  3-/5   Increase strength in affected mm groups by 1/3 grade   Balance Sitting EOB:  fair used bilateral upper extremities for support however able to maintain balance when asked to not use arms, but immediately returns to bilateral upper extremities support with upper extremities adducted,  externally rotated, supination and  wrist extension 90*  Dynamic Standing:  poor + Sitting EOB: not assessed   Dynamic Standing: not assessed    Sitting EOB:  good -  Dynamic Standing: fair       Patient education Patient educated on role of Physical Therapy, risks of immobility, safety and plan of care,  importance of mobility while in hospital , ankle pumps, quad set and glut set for edema control, blood clot prevention and positioning for skin integrity and comfort. Patient response to education:   Pt verbalized understanding Pt demonstrated skill Pt requires further education in this area   Yes Partial Yes      Treatment: Patient practiced and was instructed/facilitated in the following treatment: Patient transferred to edge of bed. Patient Sat edge of bed 20 minutes with Supervision  to increase dynamic sitting balance and activity tolerance. Patient performed seated exercises and returned to supine at end of treatment. Therapeutic Exercises: Patient performed AROM ankle pumps, hip abduction/adduction, long arc quad and seated marching  x 15 reps, AROM, 2 sets. V/C provided for technique. At end of session, patient in bed with alarm call light and phone within reach, all lines and tubes intact, nursing notified. Patient would benefit from continued skilled Physical Therapy to improve functional independence and quality of life. Patient's/ family goals   get stronger        ASSESSMENT: Patient exhibits decreased strength, balance, endurance and range of motion impairing functional mobility, transfers, gait , gait distance and tolerance to activity. Patient able to perform exercises with AROM and is motivated to participate. Patient needed to use both arms for support to maintain mid-line position with sitting.      Plan of Care:   -Bed Mobility: Lower extremity exercises  and Trunk control activities   -Sitting Balance: Incorporate reaching activities to activate trunk muscles   -Standing Balance: Perform strengthening exercises in standing to promote motor control with or without upper extremity support  and Instruct patient on adequate base of support to maintain balance -Transfers: Provide instruction on proper hand and foot position for adequate transfer of weight onto lower extremities and use of gait device and Provide stabilization to prevent fall   -Gait: Gait training and Standing activities to improve: base of support, weight shift, weight bearing    -Endurance: Utilize Supervised activities to increase level of endurance to allow for safe functional mobility including transfers and gait     Patient and or family understand(s) diagnosis, prognosis, and plan of care. Frequency of treatments: Patient will be seen daily.        Time in: 1:41  Time out: 2:05    Total Treatment Time: 24 minutes    CPT codes:  Therapeutic activities (90525)   12 minutes  1 unit(s)  Therapeutic exercises (03712)   12 minutes  1 unit(s)     Linda Frederick PTA   LIC# [id number]

## 2021-02-28 NOTE — CONSULTS
INPATIENT CARDIOLOGY CONSULT    Name: Timothy Boas    Age: 72 y.o. Date of Service: 2/28/2021    History of Present Illness:  52-year-old male with past medical history of COPD, hypertension and asbestosis exposure while working in the [employer]. He presents with worsening shortness of breath. Found to have COPD exacerbation and treated with inhalers and requiring oxygen. He also found to have an ASHLEY. The ASHLEY resolved since admission. Lab work-up was also significant for microcytic anemia. BNP was elevated at 2600. Echocardiogram was ordered. It showed an interventricular septal bounce. No other significant abnormalities shown on the echocardiogram.  ECG shows low voltage QRS with no other abnormalities. Review of Systems:  Cardiac: As per HPI  General: No fever, chills  Pulmonary: As per HPI  HEENT: No visual disturbances, difficult swallowing  GI: No nausea, vomiting  Endocrine: No thyroid disease or DM  Musculoskeletal: MUNOZ x 4, no focal motor deficits  Skin: Intact, no rashes  Neuro/Psych: No headache or seizures    Past Medical History:  Past Medical History:   Diagnosis Date    COPD (chronic obstructive pulmonary disease) ([facility]        Past Surgical History:  History reviewed. No pertinent surgical history. Family History:  History reviewed. No pertinent family history.     Social History:  Social History     Socioeconomic History    Marital status:      Spouse name: Not on file    Number of children: Not on file    Years of education: Not on file    Highest education level: Not on file   Occupational History    Not on file   Social Needs    Financial resource strain: Not on file    Food insecurity     Worry: Not on file     Inability: Not on file    Transportation needs     Medical: Not on file     Non-medical: Not on file   Tobacco Use    Smoking status: Former Smoker     Quit date: 2/24/2011     Years since quitting: 10.0  Smokeless tobacco: Never Used   Substance and Sexual Activity    Alcohol use:  Yes     Alcohol/week: 2.0 standard drinks     Types: 2 Shots of liquor per week    Drug use: Never    Sexual activity: Not on file   Lifestyle    Physical activity     Days per week: Not on file     Minutes per session: Not on file    Stress: Not on file   Relationships    Social connections     Talks on phone: Not on file     Gets together: Not on file     Attends Evangelical service: Not on file     Active member of club or organization: Not on file     Attends meetings of clubs or organizations: Not on file     Relationship status: Not on file    Intimate partner violence     Fear of current or ex partner: Not on file     Emotionally abused: Not on file     Physically abused: Not on file     Forced sexual activity: Not on file   Other Topics Concern    Not on file   Social History Narrative    Not on file       Allergies:  No Known Allergies    Current Medications:  Current Facility-Administered Medications   Medication Dose Route Frequency Provider Last Rate Last Admin    perflutren lipid microspheres (DEFINITY) injection 1.65 mg  1.5 mL Intravenous ONCE PRN Bernie Mcgee MD        nystatin (MYCOSTATIN) 738021 UNIT/ML suspension 500,000 Units  5 mL Oral 4x Daily Bernie Mcgee MD   500,000 Units at 02/28/21 1315    pantoprazole (PROTONIX) tablet 40 mg  40 mg Oral BID AC Lonnie Jean, DO   40 mg at 02/28/21 0537    LORazepam (ATIVAN) injection 1 mg  1 mg Intravenous Q6H PRN Bernie Mcgee MD   1 mg at 02/27/21 2039    predniSONE (DELTASONE) tablet 40 mg  40 mg Oral Daily Alisha Baker MD   40 mg at 02/28/21 0749    HYDROcodone-acetaminophen ([facility]) 5-325 MG per tablet 1 tablet  1 tablet Oral TID PRN Bernie Mcgee MD   1 tablet at 02/28/21 1316    ipratropium-albuterol (DUONEB) nebulizer solution 1 ampule  1 ampule Inhalation Q4H PRN Bernie Mcgee MD   1 ampule at 02/26/21 8288  rOPINIRole (REQUIP) tablet 0.5 mg  0.5 mg Oral Nightly Andrew Loco MD   0.5 mg at 02/27/21 2037    verapamil (CALAN SR) extended release tablet 120 mg  120 mg Oral Daily Andrew Loco MD   120 mg at 02/28/21 0749    Vitamin D (CHOLECALCIFEROL) tablet 1,000 Units  1,000 Units Oral Nightly Andrew Loco MD   1,000 Units at 02/27/21 2038    sodium chloride flush 0.9 % injection 10 mL  10 mL Intravenous 2 times per day Andrew Loco MD   10 mL at 02/28/21 0758    sodium chloride flush 0.9 % injection 10 mL  10 mL Intravenous PRN Andrew Loco MD   10 mL at 02/27/21 1357    enoxaparin (LOVENOX) injection 40 mg  40 mg Subcutaneous Daily Andrew Loco MD   40 mg at 02/28/21 0749    promethazine (PHENERGAN) tablet 12.5 mg  12.5 mg Oral Q6H PRN Andrew Loco MD        Or    ondansetron (ZOFRAN) injection 4 mg  4 mg Intravenous Q6H PRN Andrew Loco MD        polyethylene glycol (GLYCOLAX) packet 17 g  17 g Oral Daily PRN Andrew Looc MD        acetaminophen (TYLENOL) tablet 650 mg  650 mg Oral Q6H PRN Andrew Loco MD        Or    acetaminophen (TYLENOL) suppository 650 mg  650 mg Rectal Q6H PRN Andrew Loco MD        budesonide (PULMICORT) nebulizer suspension 500 mcg  0.5 mg Nebulization BID Andrew Loco MD   500 mcg at 02/28/21 3945    And    Arformoterol Tartrate (BROVANA) nebulizer solution 15 mcg  15 mcg Nebulization BID Andrew Loco MD   15 mcg at 02/28/21 8997    ipratropium (ATROVENT) 0.02 % nebulizer solution 0.5 mg  0.5 mg Nebulization 4x daily Andrew Loco MD   0.5 mg at 02/28/21 0950    verapamil (CALAN SR) extended release tablet 240 mg  240 mg Oral Nightly Andrew Loco MD   240 mg at 02/27/21 2038       Physical Exam:  BP (!) 117/58   Pulse 89   Temp 97.9 °F (36.6 °C) (Oral)   Resp 17   Ht 5' 10\" (1.778 m)   Wt 163 lb 6.4 oz (74.1 kg)   SpO2 97%   BMI 23.45 kg/m² Wt Readings from Last 3 Encounters:   02/28/21 163 lb 6.4 oz (74.1 kg)     Appearance: Awake, alert, no acute respiratory distress  Skin: Intact, no rash  Head: Normocephalic, atraumatic  Eyes: EOMI, no conjunctival erythema  Neck: Supple, no elevated JVP, no carotid bruits  Lungs: Decreased breathing sounds bilaterally.   Cardiac: Regular rate and rhythm, +S1S2, no murmurs apparent  Abdomen: Soft, nontender, +bowel sounds  Extremities: Moves all extremities x 4, no lower extremity edema  Neurologic: No focal motor deficits apparent, normal mood and affect  Peripheral Pulses: Intact posterior tibial pulses bilaterally    Laboratory Tests:  Lab Results   Component Value Date    CREATININE 1.0 02/28/2021    BUN 26 (H) 02/28/2021     02/28/2021    K 3.6 02/28/2021     02/28/2021    CO2 30 (H) 02/28/2021     No results found for: MG  Lab Results   Component Value Date    WBC 7.4 02/28/2021    HGB 10.9 (L) 02/28/2021    HCT 33.2 (L) 02/28/2021    .2 (H) 02/28/2021     02/28/2021     Lab Results   Component Value Date    ALT 40 02/26/2021    AST 47 (H) 02/26/2021    ALKPHOS 90 02/26/2021    BILITOT 0.4 02/26/2021     Lab Results   Component Value Date    TROPONINI 0.08 (H) 02/24/2021     No results found for: INR, PROTIME  Lab Results   Component Value Date    TSH 1.610 02/24/2021     Lab Results   Component Value Date    LABA1C 4.9 02/24/2021     No results found for: EAG  Lab Results   Component Value Date    CHOL 173 02/25/2021     Lab Results   Component Value Date    TRIG 91 02/25/2021     Lab Results   Component Value Date     02/25/2021     Lab Results   Component Value Date    LDLCALC 54 02/25/2021     Lab Results   Component Value Date    LABVLDL 18 02/25/2021     No results found for: [facility]  Recent Labs     02/26/21  1617   PROBNP 2,685*     ASSESSMENT / PLAN: 61-year-old male with past medical history of COPD, hypertension and asbestosis exposure presents with COPD exacerbation. BNP was elevated at 2600. Echocardiogram showed an interventricular septal bounce with no other significant abnormalities. ECG shows low voltage QRS with no other abnormalities. Recommendations  Differential diagnosis includes constrictive pericarditis versus coronary artery disease. I recommend stress cardiac MRI as an outpatient. Patient follows with primary care physician and he saw cardiology in the past at [facility] in [place]. I advised him to follow-up with his cardiologist at [facility].  Please provide the patient with copy of his echo report and my notes upon discharge. Thank you for allowing me to participate in your patient's care. Please feel free to contact me if you have any questions or concerns.     Cristino Saleem MD  [facility]) Cardiology

## 2021-03-01 NOTE — PROGRESS NOTES
OT BEDSIDE TREATMENT NOTE      Date:3/1/2021  Patient Name: Ivory Puentes  MRN: [id number]  : 1950  Room: [address] [place] [address]   Referring Provider: Christina Young MD     Evaluating OT: Milagromeme Rosenthal OTR/L #[id number]     AM-PAC Daily Activity Raw Score: 15/24     Recommended Placement: Subacute rehab  Recommended Adaptive Equipment: TBD      Diagnosis:   1. COPD exacerbation ([facility]    2. ASH[facility] (acute kidney [facility]) ([facility]    3. Elevated troponin    4. Calculus of gallbladder without cholecystitis without obstruction          Surgery: N/A       Pertinent Medical History:    Past Medical History        Past Medical History:   Diagnosis Date    COPD (chronic obstructive pulmonary disease) ([facility]           Precautions:  Falls, alarm, O2, B knees buckle, dupuytren's contractures     Home Living: Pt lives with wife in a 5th floor 1 story apartment with elevator with 0 SIMA with 0 rail(s).   Bathroom setup: tub/shower   Equipment owned: [employer], cane, shower chair, scooter, w/c     Prior Level of Function: Assist prn with ADLs/IADLs, needing more assist recently; ambulated with cane  Driving: No  Occupation: Not reported     Pain Level: slight pain d/t cut on lateral side of R ear per pt. unquantified  Cognition: A&O: 4/4; Follows 2 step directions              Memory:  good              Sequencing:  Fair+              Problem solving:  Fair+              Judgement/safety:  Fair+                Functional Assessment:    Initial Eval Status  Date: 21 Treatment Status  Date:3/1/2021 STGs = LTGs  Time frame: 5-7 days   Feeding Minimal Assist   Due to B hand contractures, digits 2-5 flexed into palm N/T Set-up   Grooming Moderate Assist    Min A for container management d/t B hand contractures; pt able to bring mouthwash to mouth to rinse when seated in bed; min a to don shampoo cap when seated EOB; pt required mod A to wash hair d/t B hand contractures, although pt willing to participate in task; pt able to towel dry and brush hair when seated EOB Minimal Assist    UB Dressing Minimal Assist   Don gown around back, assist with fasteners, threading arms.   Min A to don gown around back; pt able to thread B UE's through gown; assist to tie back of gown and thread monitor lines through gown Supervision    LB Dressing Maximal Assist   Don socks at bed level, pt able to lift heels off of bed, however, poor control during descent. Max A to don/doff socks d/t B hand contractures; pt states his wife assists him with LE dressing  Minimal Assist    Bathing Maximal Assist    Min/mod A ; pt able to wash UB when seated EOB, as well as to complete pericare; assist to wash back and distal LE's and feet when seated EOB d/t decreased trunk flexion Minimal Assist    Toileting Maximal Assist      N/T Minimal Assist    Bed Mobility  Supine to sit: Minimal Assist   Sit to supine: NT  Pt sitting EOB with PT      Min A for supine to sit; min A for scooting; sit to supine at supervision; HOB elevated; alarm on; all needs within reach; assist to support UB to sitting; pt able to support B LE's to supine Supine to sit:  Independent   Sit to supine: Independent    Functional Transfers Minimal Assist of 2  Sit<>stand  HHA   Cuing on body mechanics, blocking knees and safety N/T as pt declined sit to stand transfers to/from EOB at this time d/t fatigue; pt states he had \"been through the ringer today\"  Supervision    Functional Mobility Moderate Assist of 2  Using HHA, several side steps to HOB, knees buckling and hips swaying in direction of steps.     N/T  Minimal Assist    Balance Sitting:     Static:  Fair+    Dynamic:fair+  Standing: fair Sitting:     Static:  Fair+ Dynamic:fair+  Standing: N/T  Sitting:     Static:  good    Dynamic:good  Standing: fair+   Activity Tolerance Fair  SpO2 93 at rest, 96-98% with activity  O2 2L Fair    Fair+   Visual/  Perceptual Glasses: Yes   WFL          Hand Dominance Right       Strength ROM Additional Info:    RUE  4-/5  WFL  Digits 2-5 flexed into palm Limited  and FMC/dexterity noted during ADL tasks         LUE 4-/5  WFL  Digits 2-5 flexed into palm Limited  and FMC/dexterity noted during ADL tasks          Comments: Patient cleared by nursing staff. Upon arrival pt supine in bed. Pt agreeable to OT tx session. Pt educated with regards to bed mobility, hand placement, safety awareness, static sitting balance,  ADL retraining, grooming tasks, UE/LE bathing, LE/UE dressing, ECT's. At end of session pt supine in bed  with all lines and tubes intact, call light within reach. Overall, pt demonstrated decreased independence and safety during completion of ADL/functional transfers/mobility tasks. Pt would benefit from continued skilled OT to increase safety and independence with completion of ADL/IADL tasks for functional independence and quality of life. Pt required cues and education as noted above for safe facilitation and completion of tasks. Therapist provided skilled monitoring of patient's response during treatment session. Prior to and at the end of session, environmental modifications /O2 line management completed for patients safety and efficiency of treatment session. Overall, patient demonstrates moderate difficulties with completion of BADLs and IADLs. Factors contributing to these difficulties include B hand contractures, decreased endurance, and generalized weakness. As noted above, patient likely to benefit from further OT intervention to increase independence, safety, and overall quality of life.      Treatment:

## 2021-03-01 NOTE — PROGRESS NOTES
Patient educated on role of Physical Therapy, risks of immobility, safety and plan of care,  importance of mobility while in hospital , ankle pumps, quad set and glut set for edema control, blood clot prevention and positioning for skin integrity and comfort. Patient response to education:   Pt verbalized understanding Pt demonstrated skill Pt requires further education in this area   Yes Partial Yes      Treatment: Patient practiced and was instructed/facilitated in the following treatment: Patient assisted nursing with pulling patient over from the transport cart. Once back on his bed, assisted nursing with multiple rolls to remove/replace bed linens, TAPS sheet and chux pad. Assisted nursing with pulling him up towards [facility] was maximal assist x 2. Therapeutic Exercises:  not performed    At end of session, patient in bed with alarm call light and phone within reach, all lines and tubes intact, nursing notified. Patient would benefit from continued skilled Physical Therapy to improve functional independence and quality of life. Patient's/ family goals   get stronger        ASSESSMENT: Patient exhibits decreased strength, balance, endurance and range of motion impairing functional mobility, transfers, gait , gait distance and tolerance to activity. Patient able to assist with all rolling but needed minimal assist x 1.     Plan of Care:   -Bed Mobility: Lower extremity exercises  and Trunk control activities   -Sitting Balance: Incorporate reaching activities to activate trunk muscles   -Standing Balance: Perform strengthening exercises in standing to promote motor control with or without upper extremity support  and Instruct patient on adequate base of support to maintain balance  -Transfers: Provide instruction on proper hand and foot position for adequate transfer of weight onto lower extremities and use of gait device and Provide stabilization to prevent fall -Gait: Gait training and Standing activities to improve: base of support, weight shift, weight bearing    -Endurance: Utilize Supervised activities to increase level of endurance to allow for safe functional mobility including transfers and gait     Patient and or family understand(s) diagnosis, prognosis, and plan of care. Frequency of treatments: Patient will be seen daily. Time in: 2:17  Time out: 2:25    Total Treatment Time: 8 minutes    CPT codes:  Therapeutic activities (49254)   8 minutes  1 unit(s)     Blaire Al  [facility]  LIC # [id number]

## 2021-03-01 NOTE — PROGRESS NOTES
Cardiovascular: Denies palpitations, chest pain, and chest pressure. Respiratory: Denies cough, dyspnea at rest, hemoptysis, apnea, and choking. Gastrointestinal: Denies nausea, vomiting, poor appetite, diarrhea, heartburn or reflux  Genitourinary: Denies dysuria, frequency, urgency or hematuria  Musculoskeletal: Denies myalgias, muscle weakness, and bone pain  Neurological: Denies dizziness, vertigo, headache, and focal weakness  Psychological: Denies anxiety and depression  Endocrine: Denies heat intolerance and cold intolerance  Hematopoietic/Lymphatic: Denies bleeding problems and blood transfusions    OBJECTIVE:  Vitals:    03/01/21 1512   BP: (!) 145/83   Pulse: 90   Resp: 16   Temp: 97.5 °F (36.4 °C)   SpO2: 99%        O2 Flow Rate (L/min): 2.5 L/min  O2 Device: Nasal cannula    PHYSICAL EXAM:  Constitutional: No fever, chills, diaphoresis  Skin: No skin rash, no skin breakdown  HEENT: Unremarkable  Neck: No JVD, lymphadenopathy, thyromegaly. There is use of accessory muscles of breathing noted  Cardiovascular: S1, S2 normal.  No S3 murmurs or rubs present  Respiratory: Decreased breath sounds bilaterally with limited air exit and high-pitched  expiratory wheeze  Gastrointestinal: Soft, flat, nontender  Genitourinary: No CVA tenderness  Extremities: Clubbing, cyanosis, or edema  Neurological: Awake, alert, oriented x3.   No evidence of focal motor or sensory deficits  Psychological: Appropriate affect    LABS:  WBC   Date Value Ref Range Status   02/28/2021 7.4 4.5 - 11.5 E9/L Final   02/26/2021 3.9 (L) 4.5 - 11.5 E9/L Final   02/25/2021 5.3 4.5 - 11.5 E9/L Final     Hemoglobin   Date Value Ref Range Status   02/28/2021 10.9 (L) 12.5 - 16.5 g/dL Final   02/26/2021 11.6 (L) 12.5 - 16.5 g/dL Final   02/25/2021 11.0 (L) 12.5 - 16.5 g/dL Final     Hematocrit   Date Value Ref Range Status   02/28/2021 33.2 (L) 37.0 - 54.0 % Final   02/26/2021 34.2 (L) 37.0 - 54.0 % Final 02/25/2021 33.5 (L) 37.0 - 54.0 % Final     MCV   Date Value Ref Range Status   02/28/2021 109.2 (H) 80.0 - 99.9 fL Final   02/26/2021 105.9 (H) 80.0 - 99.9 fL Final   02/25/2021 107.7 (H) 80.0 - 99.9 fL Final     Platelets   Date Value Ref Range Status   02/28/2021 229 130 - 450 E9/L Final   02/26/2021 238 130 - 450 E9/L Final   02/25/2021 246 130 - 450 E9/L Final     Sodium   Date Value Ref Range Status   02/28/2021 139 132 - 146 mmol/L Final   02/26/2021 132 132 - 146 mmol/L Final   02/25/2021 137 132 - 146 mmol/L Final     Potassium   Date Value Ref Range Status   02/28/2021 3.6 3.5 - 5.0 mmol/L Final   02/26/2021 3.7 3.5 - 5.0 mmol/L Final     Potassium reflex Magnesium   Date Value Ref Range Status   02/25/2021 4.1 3.5 - 5.0 mmol/L Final   02/24/2021 4.4 3.5 - 5.0 mmol/L Final     Chloride   Date Value Ref Range Status   02/28/2021 100 98 - 107 mmol/L Final   02/26/2021 94 (L) 98 - 107 mmol/L Final   02/25/2021 98 98 - 107 mmol/L Final     CO2   Date Value Ref Range Status   02/28/2021 30 (H) 22 - 29 mmol/L Final   02/26/2021 30 (H) 22 - 29 mmol/L Final   02/25/2021 22 22 - 29 mmol/L Final     BUN   Date Value Ref Range Status   02/28/2021 26 (H) 8 - 23 mg/dL Final   02/26/2021 28 (H) 8 - 23 mg/dL Final   02/25/2021 45 (H) 8 - 23 mg/dL Final     CREATININE   Date Value Ref Range Status   02/28/2021 1.0 0.7 - 1.2 mg/dL Final   02/26/2021 1.0 0.7 - 1.2 mg/dL Final   02/25/2021 1.4 (H) 0.7 - 1.2 mg/dL Final     Glucose   Date Value Ref Range Status   02/28/2021 98 74 - 99 mg/dL Final   02/26/2021 192 (H) 74 - 99 mg/dL Final   02/25/2021 91 74 - 99 mg/dL Final     Calcium   Date Value Ref Range Status   02/28/2021 8.4 (L) 8.6 - 10.2 mg/dL Final   02/26/2021 8.6 8.6 - 10.2 mg/dL Final   02/25/2021 8.1 (L) 8.6 - 10.2 mg/dL Final     Total Protein   Date Value Ref Range Status   02/26/2021 5.7 (L) 6.4 - 8.3 g/dL Final   02/25/2021 5.3 (L) 6.4 - 8.3 g/dL Final     Albumin   Date Value Ref Range Status aspiration. Stasis of contrast in the esophagus. Further evaluation with esophagram is   suggested to exclude the possibility of a lesion in the distal esophagus. Please see separate speech pathology report for full discussion of findings   and recommendations. MRI CERVICAL SPINE WO CONTRAST   Final Result   Motion degraded examination, limiting evaluation. Multilevel cervical spondylosis as detailed above, most significant at C4-C5   with severe spinal canal stenosis and severe right neural foraminal stenosis   as well as at C5-C6 with moderate to severe spinal canal stenosis and at   least bilateral moderate neural foraminal stenosis. Right lower neck 2.3 cm soft tissue nodule, difficult to assess on these   motion degraded images. Findings may represent a thyroid nodule versus   cervical lymphadenopathy. Soft tissue neck/thyroid ultrasound examination is   recommended for further evaluation. MRI BRAIN WO CONTRAST   Final Result   No acute intracranial abnormality. No restricted diffusion to suggest an   acute infarct. CT CHEST WO CONTRAST   Final Result   Moderate emphysema, no acute pulmonary abnormalities. Cholelithiasis, no specific signs of cholecystitis. Mild ectasia ascending thoracic aorta              PROBLEM LIST:  Active Problems:    COPD exacerbation (HCC)    Moderate protein-calorie malnutrition ([address]  Resolved Problems:    * No resolved hospital problems. *      IMPRESSION:  1. Acute respiratory failure probably secondary to COPD exacerbation  2. Question ischemic heart disease    PLAN:  1. Taper oral steroids  2. Change Atrovent to Spiriva  3. Add azithromycin which has both anti-infective and anti-inflammatory properties which should be continued as an outpatient as a Z-Kris  4. Room air ABGs in a.m.       Electronically signed by Gerard Shelley MD on 3/1/2021 at 3:50 PM

## 2021-03-01 NOTE — PROGRESS NOTES
Spoke with sleep lab. Patient information given. They state after getting order and going over notes, if accepted will contact patient to set up for appt.

## 2021-03-01 NOTE — PROGRESS NOTES
Department of Internal Medicine  General Internal Medicine  Attending Progress Note      SUBJECTIVE:    No complaints. Denied fever and chills. No chest pain.      OBJECTIVE      Medications    Current Facility-Administered Medications: methylPREDNISolone (MEDROL) tablet 24 mg, 24 mg, Oral, Once  [START ON 3/2/2021] methylPREDNISolone (MEDROL) tablet 4 mg, 4 mg, Oral, QAM AC  [START ON 3/2/2021] methylPREDNISolone (MEDROL) tablet 4 mg, 4 mg, Oral, Lunch  [START ON 3/2/2021] methylPREDNISolone (MEDROL) tablet 4 mg, 4 mg, Oral, Dinner  [START ON 3/2/2021] methylPREDNISolone (MEDROL) tablet 8 mg, 8 mg, Oral, Nightly  [START ON 3/3/2021] methylPREDNISolone (MEDROL) tablet 4 mg, 4 mg, Oral, Nightly  tiotropium (SPIRIVA) inhalation capsule 18 mcg, 18 mcg, Inhalation, Daily  azithromycin (ZITHROMAX) tablet 500 mg, 500 mg, Oral, Daily **FOLLOWED BY** [START ON 3/2/2021] azithromycin (ZITHROMAX) tablet 250 mg, 250 mg, Oral, Daily  perflutren lipid microspheres (DEFINITY) injection 1.65 mg, 1.5 mL, Intravenous, ONCE PRN  nystatin (MYCOSTATIN) 657476 UNIT/ML suspension 500,000 Units, 5 mL, Oral, 4x Daily  pantoprazole (PROTONIX) tablet 40 mg, 40 mg, Oral, BID AC  LORazepam (ATIVAN) injection 1 mg, 1 mg, Intravenous, Q6H PRN  HYDROcodone-acetaminophen (NORCO) 5-325 MG per tablet 1 tablet, 1 tablet, Oral, TID PRN  ipratropium-albuterol (DUONEB) nebulizer solution 1 ampule, 1 ampule, Inhalation, Q4H PRN  rOPINIRole (REQUIP) tablet 0.5 mg, 0.5 mg, Oral, Nightly  verapamil (CALAN SR) extended release tablet 120 mg, 120 mg, Oral, Daily  Vitamin D (CHOLECALCIFEROL) tablet 1,000 Units, 1,000 Units, Oral, Nightly  sodium chloride flush 0.9 % injection 10 mL, 10 mL, Intravenous, 2 times per day  sodium chloride flush 0.9 % injection 10 mL, 10 mL, Intravenous, PRN  enoxaparin (LOVENOX) injection 40 mg, 40 mg, Subcutaneous, Daily promethazine (PHENERGAN) tablet 12.5 mg, 12.5 mg, Oral, Q6H PRN **OR** ondansetron (ZOFRAN) injection 4 mg, 4 mg, Intravenous, Q6H PRN  polyethylene glycol (GLYCOLAX) packet 17 g, 17 g, Oral, Daily PRN  acetaminophen (TYLENOL) tablet 650 mg, 650 mg, Oral, Q6H PRN **OR** acetaminophen (TYLENOL) suppository 650 mg, 650 mg, Rectal, Q6H PRN  budesonide (PULMICORT) nebulizer suspension 500 mcg, 0.5 mg, Nebulization, BID **AND** Arformoterol Tartrate (BROVANA) nebulizer solution 15 mcg, 15 mcg, Nebulization, BID  verapamil (CALAN SR) extended release tablet 240 mg, 240 mg, Oral, Nightly  Physical    VITALS:  BP (!) 145/83   Pulse 90   Temp 97.5 °F (36.4 °C) (Infrared)   Resp 16   Ht 5' 10\" (1.778 m)   Wt 163 lb 3.2 oz (74 kg)   SpO2 99%   BMI 23.42 kg/m²   CONSTITUTIONAL:  awake, alert, cooperative, no apparent distress, and appears stated age  EYES:  Lids and lashes normal, pupils equal, round and reactive to light, extra ocular muscles intact, sclera clear, conjunctiva normal  ENT:  Normocephalic, without obvious abnormality, atraumatic, sinuses nontender on palpation, external ears without lesions, oral pharynx with moist mucus membranes, tonsils without erythema or exudates, gums normal and good dentition. BACK:  Symmetric, no curvature, spinous processes are non-tender on palpation, paraspinous muscles are non-tender on palpation, no costal vertebral tenderness  LUNGS: Diminished lung sounds with mild wheezing. CARDIOVASCULAR:  Normal apical impulse, regular rate and rhythm, normal S1 and S2, no S3 or S4, and no murmur noted  ABDOMEN:  No scars, normal bowel sounds, soft, non-distended, non-tender, no masses palpated, no hepatosplenomegally  MUSCULOSKELETAL:  there is no redness, warmth, or swelling of the joints  NEUROLOGIC:  Awake, alert, oriented to name, place and time.     SKIN:  no bruising or bleeding  Data    CBC:   Lab Results   Component Value Date    WBC 7.4 02/28/2021    RBC 3.04 02/28/2021 HGB 10.9 02/28/2021    HCT 33.2 02/28/2021    .2 02/28/2021    MCH 35.9 02/28/2021    MCHC 32.8 02/28/2021    RDW 13.2 02/28/2021     02/28/2021    MPV 11.2 02/28/2021     CMP:    Lab Results   Component Value Date     02/28/2021    K 3.6 02/28/2021    K 4.1 02/25/2021     02/28/2021    CO2 30 02/28/2021    BUN 26 02/28/2021    CREATININE 1.0 02/28/2021    GFRAA >60 02/28/2021    LABGLOM >60 02/28/2021    GLUCOSE 98 02/28/2021    PROT 5.7 02/26/2021    LABALBU 3.4 02/26/2021    CALCIUM 8.4 02/28/2021    BILITOT 0.4 02/26/2021    ALKPHOS 90 02/26/2021    AST 47 02/26/2021    ALT 40 02/26/2021       ASSESSMENT AND PLAN    Brief history of stay:  Mr. Devorah Davis was admitted with difficulty swallowing and change in voice. Initial evaluation was pointing to neuro. He had MRI done which is negative. He was evaluated by speech. There was concern for stricture/stenosis of mid esophagus. He is being followed by GI for possible esophagram tomorrow. He was noted to have COPD with exacerbation. He was treated with breathing treatments and steroid. Pulmonary is following. He had echocardiogram done for concern for CHF overall patient does not appear fluid overload but there is concern for constrictive pericarditis versus ischemia. Cardiology is consulted for further input. Patient to be evaluated to see if he qualifies for home oxygen at the time of discharge. He is also being evaluated for weakness of lower extremity. 1.  COPD exacerbation:   Much improved. Continue breathing treatments. IV steroid switched to p.o. upon improved. Continue supplemental oxygen. 2.  Moderate protein-calorie malnutrition:  Continue dietary supplements. 3.  Dysphagia/Dysphonia:  Barium esophagram today, Cleared by cardiology for EGD  so n.p.o. after midnight. gi said diet after barium esophagram     4. Hypertension Essential: Well-controlled on home medication    5. ASHLEY: Resolved. 6.  General Weakness:   PT OT  Ambulation as able to tolerate    7. Elevated Troponin:    8. Acute Respiratory failure with Hypoxia: due to #1  Much improved. Saturating well on 3 L/min.     9.  Oral Thrush: Continue nystatin solution

## 2021-03-01 NOTE — CARE COORDINATION
SOCIAL WORK / DISCHARGE PLANNING:  COVID neg 2/24. Sw met with pt at bedside this am, he remains agreeable to [facility] and states his wife is as well. Confirmed contact number for wife. Dario spoke with wife, Buzz Car via phone and she very much wants him to get rehab. Hx [employer] [facility] [facility] when they lived in [place]. She is not familiar with any local SARs, does have list to review. She chose [facility] ( state do not take insurance) and [facility]( they are unsure about  Insurance, checking benefits, review for acceptance.) PRECERT would be needed prior to dc. Dario did check provider directory on line, if accurate [facility] listed as provider. Dario did inquire again with Bianka Link, [employer] Skilled rep, await reply. Addendum; 238pm. Pt accepted by [facility] [id number] will be started when OT update completed. Request placed to OT manager.            Electronically signed by JERAMIE Monzon on 3/1/2021 at 2:08 PM

## 2021-03-01 NOTE — PROGRESS NOTES
PROGRESS NOTE    By Omar Meyer D.O., GI Fellow    [facility] M.D., Dr. Teddy Schaumann, M.D., Dr. Soraida Wilson., Dr. Nga Sims M.D., Dr. Mello Isidro  72 y.o.  male    SUBJECTIVE:    No acute events overnight. Patient endorses improvement of dyspnea and tolerating diet. Discussed findings on barium and recommend mentations for esophagram and likely EGD to follow. Patient agreeable.   All questions answered    OBJECTIVE:  /79   Pulse 90   Temp 97 °F (36.1 °C) (Infrared)   Resp 17   Ht 5' 10\" (1.778 m)   Wt 163 lb 3.2 oz (74 kg)   SpO2 100%   BMI 23.42 kg/m²   GEN: A&Ox3, friendly, NAD  HEENT:PEERL, no icterus  Heart: RRR  Lungs: CTAB  Abd.: soft, NT, ND, BS +, no G/R, no HSM  Extr.: no C/C/E, no brusing      Stool (measured) : 0 mL  Lab Results   Component Value Date    WBC 7.4 02/28/2021    WBC 3.9 02/26/2021    WBC 5.3 02/25/2021    HGB 10.9 02/28/2021    HGB 11.6 02/26/2021    HGB 11.0 02/25/2021    HCT 33.2 02/28/2021    .2 02/28/2021    RDW 13.2 02/28/2021     02/28/2021     02/26/2021     02/25/2021     Lab Results   Component Value Date     02/28/2021    K 3.6 02/28/2021    K 4.1 02/25/2021     02/28/2021    CO2 30 02/28/2021    BUN 26 02/28/2021    CREATININE 1.0 02/28/2021    CALCIUM 8.4 02/28/2021    PROT 5.7 02/26/2021    LABALBU 3.4 02/26/2021    BILITOT 0.4 02/26/2021    BILITOT 0.5 02/25/2021    ALKPHOS 90 02/26/2021    ALKPHOS 91 02/25/2021    AST 47 02/26/2021    AST 78 02/25/2021    ALT 40 02/26/2021    ALT 42 02/25/2021     No results found for: LIPASE  No results found for: AMYLASE      ASSESSMENT/PLAN:  1. Dysphagia   MBS reviewed  Barium esophagram today,  Cleared by cardiology for EGD  [facility] for diet after barium esophagram from GI POV, n.p.o. after midnight, and plans for EGD tomorrow  Continue supportive care      2.  Anemia   EGD as stated above Strongly recommend colonoscopy, likely as an outpatient, pending findings on EGD and hospital course        We will discuss with attending      Thao Cage DO  3/1/2021  11:19 AM    Pt seen and independently examined. Pertinent notes and lab work reviewed. Monitor reviewed showing SR. D/w Dr. Devi Thomas with physical exam and A&P. Discussed with patient - all questions answered - agreeable with the plan as delineated.     Berenice Crigler, MD  3/1/2021  11:38 AM

## 2021-03-01 NOTE — PROGRESS NOTES
PROGRESS NOTE    [facility]  Dr. Kyara Juarez M.D., Dr. Kait Burch M.D., Danielle Alexis D.O.,  Luly Alba M.D., Myesha Patel D.O., and Berenice Crigler, M.D. Collin Thompson  72 y.o.  male    SUBJECTIVE:  No new complaints. No outward bleeding. Pt with dysphagia. OBJECTIVE:    /72   Pulse 97   Temp 96.9 °F (36.1 °C)   Resp 18   Ht 5' 10\" (1.778 m)   Wt 163 lb 6.4 oz (74.1 kg)   SpO2 95%   BMI 23.45 kg/m²     HEENT:PEERL, no icterus  Heart: RRR  Lungs: CTAB  Abd.: soft, NT, ND, BS +, no G/R    Stool (measured) : 0 mL  Lab Results   Component Value Date    WBC 7.4 02/28/2021    HGB 10.9 02/28/2021    HCT 33.2 02/28/2021    .2 02/28/2021    RDW 13.2 02/28/2021     02/28/2021     Lab Results   Component Value Date     02/28/2021    K 3.6 02/28/2021    K 4.1 02/25/2021     02/28/2021    CO2 30 02/28/2021    BUN 26 02/28/2021    CREATININE 1.0 02/28/2021    CALCIUM 8.4 02/28/2021    PROT 5.7 02/26/2021    LABALBU 3.4 02/26/2021    BILITOT 0.4 02/26/2021    ALKPHOS 90 02/26/2021    AST 47 02/26/2021    ALT 40 02/26/2021     No results found for: LIPASE  No results found for: AMYLASE    ASSESSMENT/PLAN:  Patient Active Problem List   Diagnosis    COPD exacerbation ([address]    Moderate protein-calorie malnutrition ([address]       1. Dysphagia -- MBS reviewed. Plan for barium esophagram, still not done today. Pt also for heart echo. Pending results possible EGD Monday if cleared by cardiology.     2. Anemia -- pt never had colonoscopy and did d/w pt possible colon polyps, cancer, or bleeding source and will need colonoscopy but can likely be done as outpt which pt agreeable to. NPO after midnight and EGD Monday if cleared from cardiac standpoint.     DO Edward  2/28/2021  8:43 PM

## 2021-03-02 NOTE — ANESTHESIA POSTPROCEDURE EVALUATION
Department of Anesthesiology  Postprocedure Note    Patient: Candy Calle  MRN: [id number]  YOB: 1950  Date of evaluation: 3/2/2021  Time:  5:40 PM     Procedure Summary     Date: 03/02/21 Room / Location: [address] [place] [address] / [facility]    Anesthesia Start: 8630 Anesthesia Stop: 6652    Procedures:       EGD DILATION GASTRIC OUTLET (N/A )      EGD BIOPSY Diagnosis: (DIFFICULTY SWALLOWING)    Surgeons: Kolton Oreilly MD Responsible Provider: Petra Miller MD    Anesthesia Type: MAC ASA Status: 3          Anesthesia Type: MAC    Verónica Phase I: Verónica Score: 10    Verónica Phase II:      Last vitals: Reviewed and per EMR flowsheets.        Anesthesia Post Evaluation    Patient location during evaluation: PACU  Patient participation: complete - patient participated  Level of consciousness: awake  Airway patency: patent  Nausea & Vomiting: no nausea and no vomiting  Complications: no  Cardiovascular status: hemodynamically stable  Respiratory status: acceptable  Hydration status: stable

## 2021-03-02 NOTE — PLAN OF CARE
Problem: Skin Integrity:  Goal: Will show no infection signs and symptoms  Description: Will show no infection signs and symptoms  Outcome: Met This Shift  Goal: Absence of new skin breakdown  Description: Absence of new skin breakdown  Outcome: Met This Shift     Problem: Falls - Risk of:  Goal: Will remain free from falls  Description: Will remain free from falls  Outcome: Met This Shift  Goal: Absence of physical injury  Description: Absence of physical injury  Outcome: Met This Shift     Problem: Pain:  Goal: Pain level will decrease  Description: Pain level will decrease  Outcome: Met This Shift  Goal: Control of acute pain  Description: Control of acute pain  Outcome: Met This Shift  Goal: Control of chronic pain  Description: Control of chronic pain  Outcome: Met This Shift     Problem: Breathing Pattern - Ineffective:  Goal: Ability to achieve and maintain a regular respiratory rate will improve  Description: Ability to achieve and maintain a regular respiratory rate will improve  Outcome: Met This Shift

## 2021-03-02 NOTE — INTERVAL H&P NOTE
Patient's H&P was reviewed from admission date    BP (!) 123/91   Pulse 90   Temp 99 °F (37.2 °C) (Infrared)   Resp 17   Ht 5' 10\" (1.778 m)   Wt 163 lb (73.9 kg)   SpO2 96%   BMI 23.39 kg/m²        Patient examined.     Plan:   EGD    Nader Pelletier,   3/2/2021  10:09 AM

## 2021-03-02 NOTE — PROGRESS NOTES
Physical Therapy Treatment Note    Room #:  3344/6134-44  Patient Name: Feliz Waite  YOB: 1950  MRN: [id number]    Referring Provider: Marily Hernández MD   Date of Service: 3/2/2021  Evaluating Physical Therapist: Tien Seymour, PT #[id number]       Diagnosis: COPD exacerbation ([facility] [J44.1] shortness of breath    Patient Active Problem List   Diagnosis    COPD exacerbation ([facility]    Moderate protein-calorie malnutrition ([facility]      Tentative placement recommendation: Subacute rehab    Equipment recommendation: To be determined      Prior Level of Function: Patient ambulated with wheeled walker or cane but has not been able to last 2 days  Rehab Potential: fair  for baseline    Past medical history:   Past Medical History:   Diagnosis Date    COPD (chronic obstructive pulmonary disease) ([facility]      History reviewed. No pertinent surgical history. Precautions:  Up with assistance, falls and alarm, bilateral finger flexion contractures, marked bilateral lower extremities weakness lacking active terminal extension    SUBJECTIVE:  Social history: Patient lives with spouse in a apartment with No steps, to enter 5th floor, has elevator, Tub shower  Equipment owned: [employer], [address] [employer] chair and Scooter,       [address] [place] [address] Mobility Inpatient   How much difficulty turning over in bed?: A Little  How much difficulty sitting down on / standing up from a chair with arms?: A Lot  How much difficulty moving from lying on back to sitting on side of bed?: A Little  How much help from another person moving to and from a bed to a chair?: A Lot  How much help from another person needed to walk in hospital room?: A Lot  How much help from another person for climbing 3-5 steps with a railing?: Total  AM-PAC Inpatient Mobility Raw Score : 13  AM-PAC Inpatient T-Scale Score : 36.74  Mobility Inpatient CMS 0-100% Score: 64.91  Mobility Inpatient CMS G-Code Modifier : CL Nursing cleared patient for PT treatment. OBJECTIVE;   Initial Evaluation  Date: 2/25/2021 Treatment Date:  3/2/2021       Short Term/ Long Term   Goals   Was pt agreeable to Eval/treatment? Yes   Yes To be met in 3 days   Pain level   0/10    0/10    Bed Mobility    Rolling: Not assessed     Supine to sit: Not assessed     Sit to supine: Supervision     Scooting: Minimal assist of 1   Rolling: Minimal assist of 1   Supine to sit: Minimal assist of 1   Sit to supine: Minimal assist of 1   Scooting: Minimal assist of 1    Rolling: Independent    Supine to sit: Independent    Sit to supine: Independent    Scooting: Independent     Transfers Sit to stand: Moderate assist of  2  Due to lower extremities weakness, instability  Sit to stand: Not assessed       Sit to stand:  Moderate assist of 1 wheeled walker    Ambulation    2 x 2 side steps using  hand held assist with Moderate assist of  2   shuffling steps, knee buckling  not assessed     20 feet using  wheeled walker with Moderate assist of 1    Stair negotiation: ascended and descended   Not assessed    Not assessed        ROM Within functional limits except bilateral hands, passively bilateral lower extremities within functional limits however unable to actively performed terminal knee extension due to weakness     Increase range of motion 10% of affected joints    Strength BUE:  refer to OT eval  RLE:  3-/5   LLE:  3-/5   Increase strength in affected mm groups by 1/3 grade   Balance Sitting EOB:  fair used bilateral upper extremities for support however able to maintain balance when asked to not use arms, but immediately returns to bilateral upper extremities support with upper extremities adducted,  externally rotated, supination and  wrist extension 90*  Dynamic Standing:  poor + Sitting EOB: good using BUE's for support   Dynamic Standing: not assessed    Sitting EOB:  good -  Dynamic Standing: fair       Patient education -Transfers: Provide instruction on proper hand and foot position for adequate transfer of weight onto lower extremities and use of gait device and Provide stabilization to prevent fall   -Gait: Gait training and Standing activities to improve: base of support, weight shift, weight bearing    -Endurance: Utilize Supervised activities to increase level of endurance to allow for safe functional mobility including transfers and gait     Patient and or family understand(s) diagnosis, prognosis, and plan of care. Frequency of treatments: Patient will be seen daily. Time in: 10:43  Time out: 11:08    Total Treatment Time: 25 minutes    CPT codes:  Therapeutic activities (73133)   14 minutes  1 unit(s)  Therapeutic exercises (74156)   11 minutes  1 unit(s)     Gonzalez Al  [facility]  LIC # [id number]

## 2021-03-02 NOTE — CARE COORDINATION
SOCIAL WORK / DISCHARGE PLANNING:  COVID neg 2/24. Pt accepted to [facility] [phone] fax # [phone]. Per Cammie Douglass, rep [employer] is waiving [address] Bed available when medically cleared. Wife is aware and agreeable. Electronic CRAIG in pt's EPIC Chart for physician signature. HENS form completed. Addendum: 1149am- Sw met with pt at bedside to discuss dc plan, he has spoken with his wife and is agreeable to [facility] at [facility].              Electronically signed by JERAMIE Ghotra on 3/2/2021 at 9:28 AM

## 2021-03-02 NOTE — PROCEDURES
Procedure:    Esophagogastroduodenoscopy    Indication:    Dysphagia    Estimated blood loss:  Minimal less than 1 cc    Consent:   Informed consent was obtained from the patient including and not limited to risk of perforation, aspiration of gastric contents or teeth, bleeding, infection, dental breakage, ileus, need for surgery, or worst case death. Sedation  Endoscope was advanced easily through mouth to second portion of duodenum    Oropharynx:    views are limited but grossly normal.    Esophagus:   Schatzki's ring/stricture at 38 cm from the incisors, biopsied to rule out malignancy. Whitish exudate in the esophagus with no clear signs of candidiasis. 2 cm hiatal hernia. GEJ at ~41 cm. Esophagus dilated with [address]    Stomach:   Mild antral gastritis, biopsies of antrum obtained to r/o H. Pylori    Gastric body is normal.    Retroflexed views showed normal fundus and cardia. Duodenum: Bulb is normal.     Second portion of duodenum is normal.  No fresh of old blood. IMPRESSION AND PLAN:     1.  Schatzki's ring/stricture at 38 cm from the incisors, biopsied to rule out malignancy. 2.  2 cm hiatal hernia. GEJ at ~41 cm.      3. Esophagus dilated with 64 [address]    Likely source of patient's dysphagia secondary to the above findings. Okay for general diet. Okay to DC home from GI POV. Recommend PPI daily continue nystatin as previously written. Recommend outpatient screening colonoscopy as patient has never had previous screening. Follow up as outpatient in office, call [phone] to schedule for appointment.       Frida Hernadez,   3/2/2021  2:32 PM     I was present for entire duration of procedure; discussed findings with resident and agree with recommendations above    Karyle Broody MD  Gastroenterology

## 2021-03-02 NOTE — PROGRESS NOTES
promethazine (PHENERGAN) tablet 12.5 mg, 12.5 mg, Oral, Q6H PRN **OR** ondansetron (ZOFRAN) injection 4 mg, 4 mg, Intravenous, Q6H PRN  polyethylene glycol (GLYCOLAX) packet 17 g, 17 g, Oral, Daily PRN  acetaminophen (TYLENOL) tablet 650 mg, 650 mg, Oral, Q6H PRN **OR** acetaminophen (TYLENOL) suppository 650 mg, 650 mg, Rectal, Q6H PRN  budesonide (PULMICORT) nebulizer suspension 500 mcg, 0.5 mg, Nebulization, BID **AND** Arformoterol Tartrate (BROVANA) nebulizer solution 15 mcg, 15 mcg, Nebulization, BID  verapamil (CALAN SR) extended release tablet 240 mg, 240 mg, Oral, Nightly  Physical    VITALS:  BP (!) 123/91   Pulse 90   Temp 99 °F (37.2 °C) (Infrared)   Resp 17   Ht 5' 10\" (1.778 m)   Wt 163 lb (73.9 kg)   SpO2 96%   BMI 23.39 kg/m²   CONSTITUTIONAL:  awake, alert, cooperative, no apparent distress, and appears stated age  EYES:  Lids and lashes normal, pupils equal, round and reactive to light, extra ocular muscles intact, sclera clear, conjunctiva normal  ENT:  Normocephalic, without obvious abnormality, atraumatic, sinuses nontender on palpation, external ears without lesions, oral pharynx with moist mucus membranes, tonsils without erythema or exudates, gums normal and good dentition. BACK:  Symmetric, no curvature, spinous processes are non-tender on palpation, paraspinous muscles are non-tender on palpation, no costal vertebral tenderness  LUNGS: Diminished lung sounds with mild wheezing. CARDIOVASCULAR:  Normal apical impulse, regular rate and rhythm, normal S1 and S2, no S3 or S4, and no murmur noted  ABDOMEN:  No scars, normal bowel sounds, soft, non-distended, non-tender, no masses palpated, no hepatosplenomegally  MUSCULOSKELETAL:  there is no redness, warmth, or swelling of the joints  NEUROLOGIC:  Awake, alert, oriented to name, place and time.     SKIN:  no bruising or bleeding  Data    CBC:   Lab Results   Component Value Date    WBC 7.4 02/28/2021    RBC 3.04 02/28/2021 HGB 10.9 02/28/2021    HCT 33.2 02/28/2021    .2 02/28/2021    MCH 35.9 02/28/2021    MCHC 32.8 02/28/2021    RDW 13.2 02/28/2021     02/28/2021    MPV 11.2 02/28/2021     CMP:    Lab Results   Component Value Date     02/28/2021    K 3.6 02/28/2021    K 4.1 02/25/2021     02/28/2021    CO2 30 02/28/2021    BUN 26 02/28/2021    CREATININE 1.0 02/28/2021    GFRAA >60 02/28/2021    LABGLOM >60 02/28/2021    GLUCOSE 98 02/28/2021    PROT 5.7 02/26/2021    LABALBU 3.4 02/26/2021    CALCIUM 8.4 02/28/2021    BILITOT 0.4 02/26/2021    ALKPHOS 90 02/26/2021    AST 47 02/26/2021    ALT 40 02/26/2021       ASSESSMENT AND PLAN    Brief history of stay:  Mr. Hunt Leventhal was admitted with difficulty swallowing and change in voice. Initial evaluation was pointing to neuro. He had MRI done which is negative. He was evaluated by speech. There was concern for stricture/stenosis of mid esophagus. He is being followed by GI for possible esophagram tomorrow. He was noted to have COPD with exacerbation. He was treated with breathing treatments and steroid. Pulmonary is following. He had echocardiogram done for concern for CHF overall patient does not appear fluid overload but there is concern for constrictive pericarditis versus ischemia. Cardiology is consulted for further input. Patient to be evaluated to see if he qualifies for home oxygen at the time of discharge. He is also being evaluated for weakness of lower extremity. 1.  COPD exacerbation:   Much improved. Continue breathing treatments. IV steroid switched to p.o. upon improved. Continue supplemental oxygen. 2.  Moderate protein-calorie malnutrition:  Continue dietary supplements. 3.  Dysphagia/Dysphonia: worsening. Cleared by cardiology for EGD  GI work-up includes barium esophagram, MRI brain and C-spine to help rule out neuro etiology of his worsening dysphagia and worsening weakness.   Speech consulted 4.  Hypertension Essential: Well-controlled on home medication    5. ASHLEY: Resolved. 6.  General Weakness:   PT OT  Ambulation as able to tolerate    7. Elevated Troponin:    8. Acute Respiratory failure with Hypoxia: due to #1  Much improved. Saturating well on 3 L/min.     9.  Oral Thrush: Continue nystatin solution

## 2021-03-02 NOTE — ANESTHESIA PRE PROCEDURE
Department of Anesthesiology  Preprocedure Note       Name:  Joanna Bumpers   Age:  72 y.o.  :  1950                                          MRN:  [id number]         Date:  3/2/2021      Surgeon: Dang Coley):  Darnell Diaz MD    Procedure: Procedure(s):  EGD ESOPHAGOGASTRODUODENOSCOPY    Medications prior to admission:   Prior to Admission medications    Medication Sig Start Date End Date Taking? Authorizing Provider   tiotropium (SPIRIVA RESPIMAT) 2.5 MCG/ACT AERS inhaler Inhale 2 puffs into the lungs daily   Yes Historical Provider, MD   budesonide-formoterol (SYMBICORT) 160-4.5 MCG/ACT AERO Inhale 2 puffs into the lungs 2 times daily   Yes Historical Provider, MD   albuterol sulfate HFA (VENTOLIN HFA) 108 (90 Base) MCG/ACT inhaler Inhale 2 puffs into the lungs every 6 hours as needed for Wheezing or Shortness of Breath   Yes Historical Provider, MD   HYDROcodone-acetaminophen (NORCO) 5-325 MG per tablet Take 1 tablet by mouth 3 times daily.    Yes Historical Provider, MD   losartan-hydroCHLOROthiazide (HYZAAR) 100-12.5 MG per tablet Take 1 tablet by mouth nightly   Yes Historical Provider, MD   verapamil (CALAN SR) 120 MG extended release tablet Take 120 mg by mouth daily   Yes Historical Provider, MD   verapamil (VERELAN) 240 MG extended release capsule Take 240 mg by mouth nightly   Yes Historical Provider, MD   rOPINIRole (REQUIP) 0.5 MG tablet Take 0.5 mg by mouth nightly   Yes Historical Provider, MD   Multiple Vitamins-Minerals (THERAPEUTIC MULTIVITAMIN-MINERALS) tablet Take 1 tablet by mouth daily   Yes Historical Provider, MD   Misc Natural Products ([address] [place] [address]) Take 1 capsule by mouth nightly   Yes Historical Provider, MD   VITAMIN D PO Take 1 capsule by mouth nightly   Yes Historical Provider, MD       Current medications:    Current Facility-Administered Medications   Medication Dose Route Frequency Provider Last Rate Last Admin    sodium chloride flush 0.9 % injection 10 mL  10 mL Intravenous 2 times per day Nader Pelletier DO   10 mL at 03/02/21 0915    sodium chloride flush 0.9 % injection 10 mL  10 mL Intravenous PRN Nader Pelletier DO        methylPREDNISolone (MEDROL) tablet 4 mg  4 mg Oral QAM AC Sahara Echeverria MD   4 mg at 03/02/21 0860    methylPREDNISolone (MEDROL) tablet 4 mg  4 mg Oral Lunch Sahara Echeverria MD        methylPREDNISolone (MEDROL) tablet 4 mg  4 mg Oral Dinner Sahara Echeverria MD        methylPREDNISolone (MEDROL) tablet 8 mg  8 mg Oral Nightly Sahara Echeverria MD        [START ON 3/3/2021] methylPREDNISolone (MEDROL) tablet 4 mg  4 mg Oral Nightly Sahara Echeverria MD        tiotropium [facility]) inhalation capsule 18 mcg  18 mcg Inhalation Daily Sahara Echeverria MD   18 mcg at 03/02/21 0517    azithromycin (ZITHROMAX) tablet 250 mg  250 mg Oral Daily Sahara Echeverria MD   250 mg at 03/02/21 0845    perflutren lipid microspheres (DEFINITY) injection 1.65 mg  1.5 mL Intravenous ONCE PRN Elvira Granado MD        nystatin (MYCOSTATIN) 052832 UNIT/ML suspension 500,000 Units  5 mL Oral 4x Daily Elvira Granado MD   500,000 Units at 03/02/21 0845    pantoprazole (PROTONIX) tablet 40 mg  40 mg Oral BID AC Lonnie Jean DO   40 mg at 03/02/21 0539    LORazepam (ATIVAN) injection 1 mg  1 mg Intravenous Q6H PRN Elvira Granado MD   1 mg at 02/28/21 2222    HYDROcodone-acetaminophen ([address]) 5-325 MG per tablet 1 tablet  1 tablet Oral TID PRN Elvira Granado MD   1 tablet at 03/02/21 0539    ipratropium-albuterol (DUONEB) nebulizer solution 1 ampule  1 ampule Inhalation Q4H PRN Elvira Granado MD   1 ampule at 02/26/21 2126    rOPINIRole (REQUIP) tablet 0.5 mg  0.5 mg Oral Nightly Elvira Granado MD   0.5 mg at 03/01/21 1937    verapamil (CALAN SR) extended release tablet 120 mg  120 mg Oral Daily Elvira Granado MD   120 mg at 03/02/21 0845    Vitamin D (CHOLECALCIFEROL) tablet 1,000 Units  1,000 Units Oral Nightly Elvira Granado MD   1,000 Units at 03/01/21 1937    sodium chloride flush 0.9 % injection 10 mL  10 mL Intravenous PRN Christa Jackson MD   10 mL at 02/27/21 1357    enoxaparin (LOVENOX) injection 40 mg  40 mg Subcutaneous Daily Christa Jackson MD   40 mg at 02/28/21 0749    promethazine (PHENERGAN) tablet 12.5 mg  12.5 mg Oral Q6H PRN Christa Jackson MD        Or    ondansetron (ZOFRAN) injection 4 mg  4 mg Intravenous Q6H PRN Christa Jackson MD        polyethylene glycol (GLYCOLAX) packet 17 g  17 g Oral Daily PRN Christa Jackson MD        acetaminophen (TYLENOL) tablet 650 mg  650 mg Oral Q6H PRN Christa Jackson MD   650 mg at 03/02/21 1457    Or    acetaminophen (TYLENOL) suppository 650 mg  650 mg Rectal Q6H PRN Christa Jackson MD        budesonide (PULMICORT) nebulizer suspension 500 mcg  0.5 mg Nebulization BID Christa Jackson MD   500 mcg at 03/02/21 0547    And    Arformoterol Tartrate (BROVANA) nebulizer solution 15 mcg  15 mcg Nebulization BID Christa Jackson MD   15 mcg at 03/02/21 0547    verapamil (CALAN SR) extended release tablet 240 mg  240 mg Oral Nightly Christa Jackson MD   240 mg at 03/01/21 1937       Allergies:  No Known Allergies    Problem List:    Patient Active Problem List   Diagnosis Code    COPD exacerbation ([facility] J44.1    Moderate protein-calorie malnutrition ([facility] E44.0       Past Medical History:        Diagnosis Date    COPD (chronic obstructive pulmonary disease) ([facility]        Past Surgical History:  History reviewed. No pertinent surgical history. Social History:    Social History     Tobacco Use    Smoking status: Former Smoker     Quit date: 2/24/2011     Years since quitting: 10.0    Smokeless tobacco: Never Used   Substance Use Topics    Alcohol use:  Yes     Alcohol/week: 2.0 standard drinks     Types: 2 Shots of liquor per week                                Counseling given: Not Answered      Vital Signs (Current):   Vitals:    03/02/21 0518 03/02/21 0547 03/02/21 0615 03/02/21 0754   BP:    (!) 123/91   Pulse:    90   Resp:    17   Temp:    99 °F (37.2 °C)   TempSrc:    Infrared   SpO2: 97%  98% 96%   Weight:  163 lb (73.9 kg)     Height:                                                  BP Readings from Last 3 Encounters:   03/02/21 (!) 123/91       NPO Status:                                                                                 BMI:   Wt Readings from Last 3 Encounters:   03/02/21 163 lb (73.9 kg)     Body mass index is 23.39 kg/m². CBC:   Lab Results   Component Value Date    WBC 7.4 02/28/2021    RBC 3.04 02/28/2021    HGB 10.9 02/28/2021    HCT 33.2 02/28/2021    .2 02/28/2021    RDW 13.2 02/28/2021     02/28/2021       CMP:   Lab Results   Component Value Date     02/28/2021    K 3.6 02/28/2021    K 4.1 02/25/2021     02/28/2021    CO2 30 02/28/2021    BUN 26 02/28/2021    CREATININE 1.0 02/28/2021    GFRAA >60 02/28/2021    LABGLOM >60 02/28/2021    GLUCOSE 98 02/28/2021    PROT 5.7 02/26/2021    CALCIUM 8.4 02/28/2021    BILITOT 0.4 02/26/2021    ALKPHOS 90 02/26/2021    AST 47 02/26/2021    ALT 40 02/26/2021       POC Tests: No results for input(s): POCGLU, POCNA, POCK, POCCL, POCBUN, POCHEMO, POCHCT in the last 72 hours.     Coags: No results found for: PROTIME, INR, APTT    HCG (If Applicable): No results found for: PREGTESTUR, PREGSERUM, HCG, HCGQUANT     ABGs: No results found for: PHART, PO2ART, PUQ2CTN, USO1CGO, BEART, Z3INWUWI     Type & Screen (If Applicable):  No results found for: LABABO, LABRH    Drug/Infectious Status (If Applicable):  No results found for: HIV, HEPCAB    COVID-19 Screening (If Applicable):   Lab Results   Component Value Date    COVID19 Not Detected 02/24/2021         Anesthesia Evaluation  Patient summary reviewed no history of anesthetic complications:   Airway: Mallampati: II  TM distance: >3 FB   Neck ROM: full  Mouth opening: > = 3 FB Dental:    (+) edentulous Pulmonary:   (+) COPD:  decreased breath sounds,                            ROS comment: COVID Neg 2/24/21   Cardiovascular:Negative CV ROS          ECG reviewed  Rhythm: regular  Rate: normal  Echocardiogram reviewed               ROS comment: ECHO 2/27/21  Summary   Technically difficult examination. Normal left ventricular chamber size. Normal left ventricular systolic function. Visually estimated LVEF is 55-60%. interventricular septal bounce motion. Differential diagnosis includes   ischemia and constrictive pericarditis. Normal right ventricle structure and function. No significant valvular abnormalities. Neuro/Psych:   Negative Neuro/Psych ROS              GI/Hepatic/Renal:            ROS comment: DIFFICULTY SWALLOWING. Endo/Other:    (+) blood dyscrasia (HGB 10.9): anemia:., .                 Abdominal:           Vascular: negative vascular ROS. Anesthesia Plan      MAC     ASA 3       Induction: intravenous. Anesthetic plan and risks discussed with patient. Plan discussed with CRNA.                   Joselito Knott MD   3/2/2021

## 2021-03-03 NOTE — CARE COORDINATION
SOCIAL WORK / DISCHARGE PLANNING:  COVID neg 2/24. Pt had change in condition today, now on bipap. Dc plan when medically stable is to [facility] [phone] fax # [phone] was reported to be able to be waived at dc with current therapy notes. Electronic CRAIG in pt's EPIC Chart for physician signature. HENS form completed.                       Electronically signed by JERAMIE Madrigal on 3/3/2021 at 9:40 AM

## 2021-03-03 NOTE — PROGRESS NOTES
Physical Therapy    [id number]/[id number]    Patient unavailable for physical therapy treatment due to RN Livan Bernardo) stated to hold therapy due to patient put on the Bipap. Bharti Chin.  Servando  [facility]  LIC # [id number]

## 2021-03-03 NOTE — PLAN OF CARE
Problem: Skin Integrity:  Goal: Will show no infection signs and symptoms  Description: Will show no infection signs and symptoms  Outcome: Met This Shift     Problem: Skin Integrity:  Goal: Absence of new skin breakdown  Description: Absence of new skin breakdown  Outcome: Met This Shift     Problem: Falls - Risk of:  Goal: Will remain free from falls  Description: Will remain free from falls  Outcome: Met This Shift     Problem: Falls - Risk of:  Goal: Absence of physical injury  Description: Absence of physical injury  Outcome: Met This Shift     Problem: Pain:  Goal: Pain level will decrease  Description: Pain level will decrease  Outcome: Ongoing     Problem: Pain:  Goal: Control of acute pain  Description: Control of acute pain  Outcome: Ongoing     Problem: Pain:  Goal: Control of chronic pain  Description: Control of chronic pain  Outcome: Ongoing     Problem: Breathing Pattern - Ineffective:  Goal: Ability to achieve and maintain a regular respiratory rate will improve  Description: Ability to achieve and maintain a regular respiratory rate will improve  Outcome: Not Met This Shift

## 2021-03-03 NOTE — PROGRESS NOTES
Department of Internal Medicine  General Internal Medicine  Attending Progress Note      SUBJECTIVE:    Called by RN since patient has become tachypneic. Events of last night noted he had respiratory distress CT chest done BiPAP placed FiO2 significantly increased see below  Subjectively he indicates that he would not want to be put on life support.   He also admits to being stressed    OBJECTIVE      Medications    Current Facility-Administered Medications: piperacillin-tazobactam (ZOSYN) 3,375 mg in dextrose 5 % 50 mL IVPB extended infusion (mini-bag), 3,375 mg, Intravenous, Q8H  0.9 % sodium chloride infusion, , Intravenous, Q8H  sodium chloride flush 0.9 % injection 10 mL, 10 mL, Intravenous, 2 times per day  sodium chloride flush 0.9 % injection 10 mL, 10 mL, Intravenous, PRN  methylPREDNISolone (MEDROL) tablet 4 mg, 4 mg, Oral, QAM AC  methylPREDNISolone (MEDROL) tablet 4 mg, 4 mg, Oral, Lunch  methylPREDNISolone (MEDROL) tablet 4 mg, 4 mg, Oral, Dinner  methylPREDNISolone (MEDROL) tablet 4 mg, 4 mg, Oral, Nightly  tiotropium (SPIRIVA) inhalation capsule 18 mcg, 18 mcg, Inhalation, Daily  [COMPLETED] azithromycin (ZITHROMAX) tablet 500 mg, 500 mg, Oral, Daily **FOLLOWED BY** azithromycin (ZITHROMAX) tablet 250 mg, 250 mg, Oral, Daily  perflutren lipid microspheres (DEFINITY) injection 1.65 mg, 1.5 mL, Intravenous, ONCE PRN  nystatin (MYCOSTATIN) 050568 UNIT/ML suspension 500,000 Units, 5 mL, Oral, 4x Daily  pantoprazole (PROTONIX) tablet 40 mg, 40 mg, Oral, BID AC  LORazepam (ATIVAN) injection 1 mg, 1 mg, Intravenous, Q6H PRN  HYDROcodone-acetaminophen (NORCO) 5-325 MG per tablet 1 tablet, 1 tablet, Oral, TID PRN  ipratropium-albuterol (DUONEB) nebulizer solution 1 ampule, 1 ampule, Inhalation, Q4H PRN  rOPINIRole (REQUIP) tablet 0.5 mg, 0.5 mg, Oral, Nightly  verapamil (CALAN SR) extended release tablet 120 mg, 120 mg, Oral, Daily  Vitamin D (CHOLECALCIFEROL) tablet 1,000 Units, 1,000 Units, Oral, Nightly sodium chloride flush 0.9 % injection 10 mL, 10 mL, Intravenous, PRN  enoxaparin (LOVENOX) injection 40 mg, 40 mg, Subcutaneous, Daily  promethazine (PHENERGAN) tablet 12.5 mg, 12.5 mg, Oral, Q6H PRN **OR** ondansetron (ZOFRAN) injection 4 mg, 4 mg, Intravenous, Q6H PRN  polyethylene glycol (GLYCOLAX) packet 17 g, 17 g, Oral, Daily PRN  acetaminophen (TYLENOL) tablet 650 mg, 650 mg, Oral, Q6H PRN **OR** acetaminophen (TYLENOL) suppository 650 mg, 650 mg, Rectal, Q6H PRN  budesonide (PULMICORT) nebulizer suspension 500 mcg, 0.5 mg, Nebulization, BID **AND** Arformoterol Tartrate (BROVANA) nebulizer solution 15 mcg, 15 mcg, Nebulization, BID  verapamil (CALAN SR) extended release tablet 240 mg, 240 mg, Oral, Nightly  Physical    VITALS:  /82   Pulse 123   Temp 101.1 °F (38.4 °C) (Oral)   Resp (!) 44   Ht 5' 10\" (1.778 m)   Wt 163 lb (73.9 kg)   SpO2 95%   BMI 23.39 kg/m²   CONSTITUTIONAL:  awake, alert, cooperative, no apparent distress, and appears stated age  EYES:  Lids and lashes normal, pupils equal, round and reactive to light, extra ocular muscles intact, sclera clear, conjunctiva normal  ENT:  Normocephalic, without obvious abnormality, atraumatic, sinuses nontender on palpation, external ears without lesions, oral pharynx with moist mucus membranes, tonsils without erythema or exudates, gums normal and good dentition. BACK:  Symmetric, no curvature, spinous processes are non-tender on palpation, paraspinous muscles are non-tender on palpation, no costal vertebral tenderness  LUNGS: Diminished lung sounds with mild wheezing. CARDIOVASCULAR:  Normal apical impulse, regular rate and rhythm, normal S1 and S2, no S3 or S4, and no murmur noted  ABDOMEN:  No scars, normal bowel sounds, soft, non-distended, non-tender, no masses palpated, no hepatosplenomegally  MUSCULOSKELETAL:  there is no redness, warmth, or swelling of the joints  NEUROLOGIC:  Awake, alert, oriented to name, place and time. SKIN:  no bruising or bleeding  Data    CBC:   Lab Results   Component Value Date    WBC 7.4 02/28/2021    RBC 3.04 02/28/2021    HGB 10.9 02/28/2021    HCT 33.2 02/28/2021    .2 02/28/2021    MCH 35.9 02/28/2021    MCHC 32.8 02/28/2021    RDW 13.2 02/28/2021     02/28/2021    MPV 11.2 02/28/2021     CMP:    Lab Results   Component Value Date     02/28/2021    K 3.6 02/28/2021    K 4.1 02/25/2021     02/28/2021    CO2 30 02/28/2021    BUN 26 02/28/2021    CREATININE 1.0 02/28/2021    GFRAA >60 02/28/2021    LABGLOM >60 02/28/2021    GLUCOSE 98 02/28/2021    PROT 5.7 02/26/2021    LABALBU 3.4 02/26/2021    CALCIUM 8.4 02/28/2021    BILITOT 0.4 02/26/2021    ALKPHOS 90 02/26/2021    AST 47 02/26/2021    ALT 40 02/26/2021       ASSESSMENT AND PLAN    Brief history of stay:  Mr. Luisa Scott was admitted with difficulty swallowing and change in voice. Initial evaluation was pointing to neuro. He had MRI done which is negative. He was evaluated by speech. There was concern for stricture/stenosis of mid esophagus. He is being followed by GI for possible esophagram tomorrow. He was noted to have COPD with exacerbation. He was treated with breathing treatments and steroid. Pulmonary is following. He had echocardiogram done for concern for CHF overall patient does not appear fluid overload but there is concern for constrictive pericarditis versus ischemia. Cardiology is consulted for further input. Patient to be evaluated to see if he qualifies for home oxygen at the time of discharge. He is also being evaluated for weakness of lower extremity. 1.  Acute Respiratory failure with Hypoxia COPD exacerbation:   Has been on breathing treatments. IV steroid switched to p.o. then back to IV as of 3/3   He has declined likely due to aspiration he is on antibiotics now but spiking a fever this morning. This is too early to consider this an antibiotic failure. He did have antipyretic Tylenol. Steroids increased as just mentioned. I came to the bedside when the nurse called me after ordering BiPAP adjustments repeat ABG afterwards I spoke to the wife who adjusted his CODE STATUS she said that he would not want to be intubated or have CPR. Given the state of her technology this has to be entered as limited code meaning no intubation ever and no ACLS once his heart stops. This was all verified by the nurse manager  His last Ativan was dosed every 1/2 hour 0.5 mg starting at 10:30 PM on 3/2 we will redose this as 1 mg this morning    2. Moderate protein-calorie malnutrition:  Continue dietary supplements. 3.  Dysphagia/Dysphonia: worsening. Cleared by cardiology for EGD  GI work-up includes barium esophagram, MRI brain and C-spine to help rule out neuro etiology of his worsening dysphagia and worsening weakness. The MRI was negative the C-spine showed lymph node versus thyroid nodule. Esophagram shows hiatal hernia without reflux or other abnormalities. speech on  GI describes that the Schatzki's ring found on the EGD on 3/2 had been biopsied and that at the time he dilated esophageal dilation    4. Hypertension Essential: Well-controlled on home medication    5. ASHLEY: Resolved. I ordered daily labs as of 3/3    6. General Weakness: PT OT can be on hold until he stabilizes    7. Elevated Troponin: We will repeat. EKG on 3/3 sinus tachycardia    8. Oral Thrush: Continue nystatin solution    9. Limited code      7:55 AM update on 3/3 family has arrived nurse manager interacted with them and called me they wish to focus on making him comfortable. No further ABG. Chest x-ray pending. I will continue current care over the next couple of hours and not become more aggressive in our care. I am hoping for some sign of improvement but will prepare for the opposite  Lab results reviewed      2:18 PM long discussion with wife sister-in-law's brother's son and other family members.    and nurse manager present as well. This occurred after I had clinically reassess the patient and saw that there was no sign of any improvement in fact that he seems to be tiring out from his increased work of breathing and tachypnea. Mental status is worse. I believe the wife is correct when she expresses that his desire was not to proceed any further with any more treatment. We have already established that he would not want to have ACLS or to be intubated. Furthermore in the presence of her supportive family wife declares that he would not want to have his BiPAP continued. In fact he wishes to only be made comfortable. Family is in agreement. I will administer morphine and possibly Ativan later.   And I will discontinue his BiPAP and further treatments

## 2021-03-03 NOTE — PROGRESS NOTES
Respiratory paged for breathing treatment, administered with no relief of respiratory distress. RRT called for respiratory distress and increased agitation, spo2 at 86, patient placed on BIPAP. Dr Jaquelin Howell at bedside. See orders administered. Patient agitated and attempting to remove Bipap continually. Bedside sitter placed with patient for safety. Patient calm, eyes closed BIPAP in place at this time.  Will continue to monitor

## 2021-03-03 NOTE — PROGRESS NOTES
PROGRESS NOTE    By Leny Padgett D.O., GI Fellow    [facility] M.D., Dr. Jyoti Gotti M.D., Dr. Bela Feng., Dr. Marcus Mejia M.D., Dr. Morales Catching  72 y.o.  male    SUBJECTIVE:    RRT called after patient developed respiratory distress. Patient was placed on BiPAP and required sedation due to distress. CTA and ABG noncontributory. OBJECTIVE:  /82   Pulse 123   Temp 101.1 °F (38.4 °C) (Oral)   Resp (!) 45   Ht 5' 10\" (1.778 m)   Wt 163 lb (73.9 kg)   SpO2 95%   BMI 23.39 kg/m²   GEN: A&Ox3, friendly, NAD  HEENT:PEERL, no icterus  Heart: RRR  Lungs: CTAB  Abd.: soft, NT, ND, BS +, no G/R, no HSM  Extr.: no C/C/E, no brusing      Stool (measured) : 0 mL  Lab Results   Component Value Date    WBC 7.4 02/28/2021    WBC 3.9 02/26/2021    WBC 5.3 02/25/2021    HGB 10.9 02/28/2021    HGB 11.6 02/26/2021    HGB 11.0 02/25/2021    HCT 33.2 02/28/2021    .2 02/28/2021    RDW 13.2 02/28/2021     02/28/2021     02/26/2021     02/25/2021     Lab Results   Component Value Date     02/28/2021    K 3.6 02/28/2021    K 4.1 02/25/2021     02/28/2021    CO2 30 02/28/2021    BUN 26 02/28/2021    CREATININE 1.0 02/28/2021    CALCIUM 8.4 02/28/2021    PROT 5.7 02/26/2021    LABALBU 3.4 02/26/2021    BILITOT 0.4 02/26/2021    BILITOT 0.5 02/25/2021    ALKPHOS 90 02/26/2021    ALKPHOS 91 02/25/2021    AST 47 02/26/2021    AST 78 02/25/2021    ALT 40 02/26/2021    ALT 42 02/25/2021     No results found for: LIPASE  No results found for: AMYLASE      ASSESSMENT/PLAN:  1. Dysphagia   s/p EGD on 03/02/2021 revealing a Schatzki's ring/stricture at 38 cm from the incisors that was biopsied, a 2 cm hiatal hernia, and patient tolerated esophageal dilation with 56 F Moran.    -Okay for general diet from GI POV    2. Sydna Cordial as stated above  Strongly recommend outpatient colonoscopy and likely capsule to follow if colonoscopy is unrevealing    3. Acute respiratory failure  -Likely secondary to COPD Exacerbation    -Per primary          We will discuss with attending      Harmony Joyner DO  3/3/2021  8:19 AM    Pt seen and independently examined. Pertinent notes and lab work reviewed. Monitor reviewed showing sinus rhythm/sinus tachycardia. D/w Dr. Giron Shadow with physical exam and A&P. Discussed with amily at bedside - all questions answered - agreeable with the plan as delineated.     Daksha Cano MD  3/3/2021  12:07 PM

## 2021-03-03 NOTE — PROGRESS NOTES
Respiratory paged for breathing treatment. Administered with no relief stated by patient. 84% on 6L NC. RRT called d/t increased respiratory distress and increased anxiety/agitation. Pt placed on BIPAP. Dr Gwen Osborn at bedside. New orders placed and administered. Patient distressed and continually attempting to remove BIPAP.  placed at bedside. Updated wife on status.  Will continue to monitor

## 2021-03-03 NOTE — PROGRESS NOTES
Attempting to draw blood gases unsuccessful X2 even with doppler only was able to get a flash . Carlos's wife entered room and voiced she only wants him to be kept comfortable and no longer wants ABG's done on him. She voiced after speaking to his brothers they all are agreeing to make him a DNR CC. Dr. Kim Friend updated and will be here to speak to them soon.

## 2021-03-03 NOTE — SIGNIFICANT EVENT
Significant Event  Hospitalist RRT  RN paged because patient beacome SOB and tachycardic. Clinical condition were evaluated at bedside. Patient's chart were reviewed, vitals, available labs and imaging were reviewed. As per evaluation, clinically patient appears agitated, anxious and SOB  Patient was seen at bedside immediately. Patient's charts, vitals, available labs and imaging were reviewed. EXAM:  /72   Pulse 130   Temp 97.8 °F (36.6 °C) (Oral)   Resp (!) 32   Ht 5' 10\" (1.778 m)   Wt 163 lb (73.9 kg)   SpO2 97%   BMI 23.39 kg/m²   GENERAL: Severe respiratory distress, Lethargic, Tachycardic,Patient is tachypneic, tachycardic and distressed with SOB. HEENT: PERRLA, no icterus. JVD None. RESPIRATORY: Bilateral equal vesicular with prolonged expiration breath sound with diffused bilateral inspiratory & expiratory wheezing. Lung bases has bilateral fine crepitations. HEART: tachycardia and regular rhythm. No abnormal heart sounds, murmur or friction rubs. ABDOMEN: Soft, nondistended, nontender. Bowel sound is present. EXTREMITIES: All peripheral pulses are present. No calf tenderness or swelling. No pedal edema is present. [facility] NEUROLOGY: Lethargic, agitated, No new focal neuro deficit. Recent Labs     02/28/21  0704      K 3.6      CO2 30*   BUN 26*   CREATININE 1.0   GLUCOSE 98   CALCIUM 8.4*       Recent Labs     02/28/21  0704   WBC 7.4   RBC 3.04*   HGB 10.9*   HCT 33.2*   .2*   MCH 35.9*   MCHC 32.8   RDW 13.2      MPV 11.2     I/O last 3 completed shifts: In: 100 [I.V.:100]  Out: 675 [Urine:675]  No intake/output data recorded. Assessment:  Active Problems:    COPD exacerbation (HCC)    Moderate protein-calorie malnutrition ([address]  Resolved Problems:    * No resolved hospital problems.  *      Assessment and Plan  # Agitated, SOB and tachycardic - due to COPD severe exacerbation - rule out PE vs aspiration Current condition is unstable with Tachycardic  Telemetry monitor, neurocheck  Needs further workup - Ordered - STAT - Port CXR - will f/u results once available  IV Blous  O2, Duoneb and IV Solumedrol  CT PE protocol > Pending. Lorene Shipley No PED - possible early pneumonia - started with IV Zosyn  Close monitoring of vitals and might need to upgrade to higher level of care if deteriorates further. RN made aware  Once labs and imaging and workup results are available, detailed note will follow by sound physicians. RRT team will continue to f/u the patient. Critical care time 35 minutes not including procedures.     SIGNATURE: Brendon Ortiz PATIENT NAME: Minna Jenkins   CONTACT # : Hospitalist on call MRN: [id number]

## 2021-03-03 NOTE — PROGRESS NOTES
notified that neck xray cannot be done in room and patient is too unstable to go downstairs.  Also notified of patient vitals.  Tia Her to do xray in morning and continue to monitor.  Electronically signed by Ramón Vu RN on 3/3/2021 at 3:54 AM

## 2021-03-03 NOTE — PROGRESS NOTES
Speech Language Pathology      NAME:  Burt Yoo  :  1950  DATE: 3/3/2021  ROOM:  [address] [place] [address]    Attempted ongoing Speech-Language Pathology intervention for dysphagia therapy . Pt unavailable at this time due to:  [x] HOLD per RN/ medical staff d/t medical status continuous bipap currently  [] Off unit for testing/ procedure    [] With medical staff   [] Declined intervention  [] Sleeping/ Lethargic   [] Other:     SLP to continue previously established POC and re-attempt as able.             COPD exacerbation ([facility] [facility] [J44.1]        Surya Valenzuela MSCCC/SLP  Speech Language Pathologist  [id number]

## 2021-03-03 NOTE — PROGRESS NOTES
STAT labs ordered. Not drawn at this time. Notified lab.  states a phlebotomist will be up to draw shortly. Will cont.  To monitor

## 2021-03-04 NOTE — PROGRESS NOTES
Called Dr. Myron Ayala to inform him that pt is unresponsive with the absence of a a pulse, blood pressure, respirations and presence dilated pupils, unresponsive to light. Second RN Daphne Grand witness. Attending will pronounce pt and/or sign death certificate.

## 2021-03-04 NOTE — PROGRESS NOTES
Dr. Ino Robertson contacted,  contacted and released pt, life bank contacted and released pt,  home notified that pt is in hospital [facility]. Pt transported to [facility].

## 2021-03-16 NOTE — DISCHARGE SUMMARY
Was admitted due to swallowing difficulty change in his voice concern for neurological event but MRI was negative so then he was evaluated by speech and GI, esophagram but meanwhile he was having a COPD exacerbation he had worsened and was placed on BiPAP the family came and had long meetings regarding this and decided to have this removed please see my note from 3/3/2021.   He continued to decline until he  from respiratory arrest.  So his ongoing diagnoses was acute respiratory failure with hypoxemia due to COPD exacerbation moderate protein caloric malnutrition dysphagia dysphonia hypertension acute kidney injury generalized weakness elevated troponin thrush

## (undated) DEVICE — YANKAUER,BULB TIP,W/O VENT,RIGID,STERILE: Brand: MEDLINE

## (undated) DEVICE — CONTAINER SPEC COLL 960ML POLYPR TRIANG GRAD INTAKE/OUTPUT

## (undated) DEVICE — KENDALL 450 SERIES MONITORING FOAM ELECTRODE - RECTANGULAR SHAPE ( 3/PK): Brand: KENDALL

## (undated) DEVICE — KIT BEDSIDE REVITAL OX 500ML

## (undated) DEVICE — GOWN ISOLATN REG YEL M WT MULTIPLY SIDETIE LEV 2

## (undated) DEVICE — Device: Brand: DEFENDO VALVE AND CONNECTOR KIT

## (undated) DEVICE — FORCEPS BX L240CM JAW DIA2.8MM L CAP W/ NDL MIC MESH TOOTH

## (undated) DEVICE — 6 X 9  1.75MIL 4-WALL LABGUARD: Brand: MINIGRIP COMMERCIAL LLC

## (undated) DEVICE — SPONGE GZ 4IN 4IN 4 PLY N WVN AVANT

## (undated) DEVICE — TUBING, SUCTION, 1/4" X 10', STRAIGHT: Brand: MEDLINE

## (undated) DEVICE — BLOCK BITE 60FR CAREGUARD

## (undated) DEVICE — LUBRICANT SURG JELLY ST BACTER TUBE 4.25OZ

## (undated) DEVICE — MASK,FACE,MAXFLUIDPROTECT,SHIELD/ERLPS: Brand: MEDLINE